# Patient Record
Sex: FEMALE | Race: WHITE | NOT HISPANIC OR LATINO | URBAN - METROPOLITAN AREA
[De-identification: names, ages, dates, MRNs, and addresses within clinical notes are randomized per-mention and may not be internally consistent; named-entity substitution may affect disease eponyms.]

---

## 2017-01-23 ENCOUNTER — COMMUNICATION - HEALTHEAST (OUTPATIENT)
Dept: FAMILY MEDICINE | Facility: CLINIC | Age: 52
End: 2017-01-23

## 2017-02-22 ENCOUNTER — HOSPITAL ENCOUNTER (OUTPATIENT)
Dept: MAMMOGRAPHY | Facility: CLINIC | Age: 52
Discharge: HOME | End: 2017-02-22
Attending: FAMILY MEDICINE

## 2017-02-22 DIAGNOSIS — Z12.31 VISIT FOR SCREENING MAMMOGRAM: ICD-10-CM

## 2017-03-02 ENCOUNTER — OFFICE VISIT - HEALTHEAST (OUTPATIENT)
Dept: FAMILY MEDICINE | Facility: CLINIC | Age: 52
End: 2017-03-02

## 2017-03-02 DIAGNOSIS — R07.0 THROAT PAIN: ICD-10-CM

## 2017-03-02 DIAGNOSIS — J02.9 ACUTE VIRAL PHARYNGITIS: ICD-10-CM

## 2017-03-02 RX ORDER — KRILL/OM-3/DHA/EPA/PHOSPHO/AST 500-110 MG
CAPSULE ORAL
Status: SHIPPED | COMMUNITY
Start: 2007-01-29

## 2017-03-04 ENCOUNTER — COMMUNICATION - HEALTHEAST (OUTPATIENT)
Dept: FAMILY MEDICINE | Facility: CLINIC | Age: 52
End: 2017-03-04

## 2017-03-15 ENCOUNTER — OFFICE VISIT - HEALTHEAST (OUTPATIENT)
Dept: FAMILY MEDICINE | Facility: CLINIC | Age: 52
End: 2017-03-15

## 2017-03-15 ENCOUNTER — COMMUNICATION - HEALTHEAST (OUTPATIENT)
Dept: FAMILY MEDICINE | Facility: CLINIC | Age: 52
End: 2017-03-15

## 2017-03-15 DIAGNOSIS — R53.83 TIRED: ICD-10-CM

## 2017-03-15 DIAGNOSIS — R11.0 NAUSEA: ICD-10-CM

## 2017-03-15 DIAGNOSIS — J45.991 COUGH VARIANT ASTHMA: ICD-10-CM

## 2017-03-15 DIAGNOSIS — J06.9 URI (UPPER RESPIRATORY INFECTION): ICD-10-CM

## 2017-03-15 DIAGNOSIS — I10 ESSENTIAL HYPERTENSION: ICD-10-CM

## 2017-03-15 DIAGNOSIS — E78.00 HYPERCHOLESTEROLEMIA: ICD-10-CM

## 2017-03-15 DIAGNOSIS — R10.9 ABDOMINAL PAIN: ICD-10-CM

## 2017-03-15 LAB
CHOLEST SERPL-MCNC: 161 MG/DL
FASTING STATUS PATIENT QL REPORTED: YES
HDLC SERPL-MCNC: 33 MG/DL
LDLC SERPL CALC-MCNC: 100 MG/DL
TRIGL SERPL-MCNC: 142 MG/DL

## 2017-03-15 ASSESSMENT — MIFFLIN-ST. JEOR: SCORE: 1251.79

## 2017-03-23 ENCOUNTER — COMMUNICATION - HEALTHEAST (OUTPATIENT)
Dept: FAMILY MEDICINE | Facility: CLINIC | Age: 52
End: 2017-03-23

## 2017-03-23 ENCOUNTER — AMBULATORY - HEALTHEAST (OUTPATIENT)
Dept: FAMILY MEDICINE | Facility: CLINIC | Age: 52
End: 2017-03-23

## 2017-03-23 ENCOUNTER — COMMUNICATION - HEALTHEAST (OUTPATIENT)
Dept: SCHEDULING | Facility: CLINIC | Age: 52
End: 2017-03-23

## 2017-03-24 ENCOUNTER — AMBULATORY - HEALTHEAST (OUTPATIENT)
Dept: FAMILY MEDICINE | Facility: CLINIC | Age: 52
End: 2017-03-24

## 2017-04-11 ENCOUNTER — COMMUNICATION - HEALTHEAST (OUTPATIENT)
Dept: FAMILY MEDICINE | Facility: CLINIC | Age: 52
End: 2017-04-11

## 2017-04-21 ENCOUNTER — AMBULATORY - HEALTHEAST (OUTPATIENT)
Dept: FAMILY MEDICINE | Facility: CLINIC | Age: 52
End: 2017-04-21

## 2017-04-21 DIAGNOSIS — R22.9 LOCALIZED SKIN MASS, LUMP, OR SWELLING: ICD-10-CM

## 2017-04-26 ENCOUNTER — OFFICE VISIT - HEALTHEAST (OUTPATIENT)
Dept: SURGERY | Facility: CLINIC | Age: 52
End: 2017-04-26

## 2017-04-26 DIAGNOSIS — D17.1 LIPOMA OF BACK: ICD-10-CM

## 2017-04-26 ASSESSMENT — MIFFLIN-ST. JEOR: SCORE: 1277.64

## 2017-05-01 ENCOUNTER — AMBULATORY - HEALTHEAST (OUTPATIENT)
Dept: SURGERY | Facility: CLINIC | Age: 52
End: 2017-05-01

## 2017-05-02 ENCOUNTER — RECORDS - HEALTHEAST (OUTPATIENT)
Dept: ADMINISTRATIVE | Facility: OTHER | Age: 52
End: 2017-05-02

## 2017-05-26 ENCOUNTER — COMMUNICATION - HEALTHEAST (OUTPATIENT)
Dept: FAMILY MEDICINE | Facility: CLINIC | Age: 52
End: 2017-05-26

## 2017-05-26 DIAGNOSIS — E28.319 PREMATURE MENOPAUSE: ICD-10-CM

## 2017-08-29 ENCOUNTER — COMMUNICATION - HEALTHEAST (OUTPATIENT)
Dept: FAMILY MEDICINE | Facility: CLINIC | Age: 52
End: 2017-08-29

## 2017-10-03 ENCOUNTER — COMMUNICATION - HEALTHEAST (OUTPATIENT)
Dept: FAMILY MEDICINE | Facility: CLINIC | Age: 52
End: 2017-10-03

## 2017-10-04 ENCOUNTER — AMBULATORY - HEALTHEAST (OUTPATIENT)
Dept: FAMILY MEDICINE | Facility: CLINIC | Age: 52
End: 2017-10-04

## 2017-11-02 ENCOUNTER — COMMUNICATION - HEALTHEAST (OUTPATIENT)
Dept: FAMILY MEDICINE | Facility: CLINIC | Age: 52
End: 2017-11-02

## 2017-11-02 DIAGNOSIS — B00.9 HERPES SIMPLEX: ICD-10-CM

## 2017-11-15 ENCOUNTER — COMMUNICATION - HEALTHEAST (OUTPATIENT)
Dept: TELEHEALTH | Facility: CLINIC | Age: 52
End: 2017-11-15

## 2017-11-15 ENCOUNTER — OFFICE VISIT - HEALTHEAST (OUTPATIENT)
Dept: FAMILY MEDICINE | Facility: CLINIC | Age: 52
End: 2017-11-15

## 2017-11-15 DIAGNOSIS — Z12.11 SCREEN FOR COLON CANCER: ICD-10-CM

## 2017-11-15 DIAGNOSIS — Z23 FLU VACCINE NEED: ICD-10-CM

## 2017-11-15 DIAGNOSIS — Z23 NEED FOR INFLUENZA VACCINATION: ICD-10-CM

## 2017-11-15 DIAGNOSIS — Z00.00 ROUTINE GENERAL MEDICAL EXAMINATION AT A HEALTH CARE FACILITY: ICD-10-CM

## 2017-11-15 DIAGNOSIS — Z12.4 PAP SMEAR FOR CERVICAL CANCER SCREENING: ICD-10-CM

## 2017-11-15 ASSESSMENT — MIFFLIN-ST. JEOR: SCORE: 1251.79

## 2017-11-16 LAB
CHOLEST SERPL-MCNC: 239 MG/DL
FASTING STATUS PATIENT QL REPORTED: YES
HDLC SERPL-MCNC: 43 MG/DL
LDLC SERPL CALC-MCNC: 160 MG/DL
TRIGL SERPL-MCNC: 179 MG/DL

## 2017-11-20 ENCOUNTER — AMBULATORY - HEALTHEAST (OUTPATIENT)
Dept: FAMILY MEDICINE | Facility: CLINIC | Age: 52
End: 2017-11-20

## 2017-11-21 LAB
HPV INTERPRETATION - HISTORICAL: NORMAL
HPV INTERPRETER - HISTORICAL: NORMAL

## 2017-11-22 LAB
BKR LAB AP ABNORMAL BLEEDING: NO
BKR LAB AP BIRTH CONTROL/HORMONES: NORMAL
BKR LAB AP CERVICAL APPEARANCE: NORMAL
BKR LAB AP GYN ADEQUACY: NORMAL
BKR LAB AP GYN INTERPRETATION: NORMAL
BKR LAB AP HPV REFLEX: NORMAL
BKR LAB AP LMP: NORMAL
BKR LAB AP PATIENT STATUS: NORMAL
BKR LAB AP PREVIOUS ABNORMAL: NO
BKR LAB AP PREVIOUS NORMAL: 2014
HIGH RISK?: NO
PATH REPORT.COMMENTS IMP SPEC: NORMAL
RESULT FLAG (HE HISTORICAL CONVERSION): NORMAL

## 2017-12-01 ENCOUNTER — COMMUNICATION - HEALTHEAST (OUTPATIENT)
Dept: FAMILY MEDICINE | Facility: CLINIC | Age: 52
End: 2017-12-01

## 2017-12-01 ENCOUNTER — AMBULATORY - HEALTHEAST (OUTPATIENT)
Dept: FAMILY MEDICINE | Facility: CLINIC | Age: 52
End: 2017-12-01

## 2017-12-01 DIAGNOSIS — I10 HYPERTENSION: ICD-10-CM

## 2017-12-04 ENCOUNTER — COMMUNICATION - HEALTHEAST (OUTPATIENT)
Dept: FAMILY MEDICINE | Facility: CLINIC | Age: 52
End: 2017-12-04

## 2017-12-04 DIAGNOSIS — B00.9 HERPES SIMPLEX: ICD-10-CM

## 2017-12-06 ENCOUNTER — COMMUNICATION - HEALTHEAST (OUTPATIENT)
Dept: FAMILY MEDICINE | Facility: CLINIC | Age: 52
End: 2017-12-06

## 2017-12-06 DIAGNOSIS — E28.319 PREMATURE MENOPAUSE: ICD-10-CM

## 2017-12-08 ENCOUNTER — AMBULATORY - HEALTHEAST (OUTPATIENT)
Dept: FAMILY MEDICINE | Facility: CLINIC | Age: 52
End: 2017-12-08

## 2017-12-08 DIAGNOSIS — E28.319 PREMATURE MENOPAUSE: ICD-10-CM

## 2017-12-12 ENCOUNTER — COMMUNICATION - HEALTHEAST (OUTPATIENT)
Dept: FAMILY MEDICINE | Facility: CLINIC | Age: 52
End: 2017-12-12

## 2017-12-13 ENCOUNTER — COMMUNICATION - HEALTHEAST (OUTPATIENT)
Dept: FAMILY MEDICINE | Facility: CLINIC | Age: 52
End: 2017-12-13

## 2017-12-19 ENCOUNTER — COMMUNICATION - HEALTHEAST (OUTPATIENT)
Dept: FAMILY MEDICINE | Facility: CLINIC | Age: 52
End: 2017-12-19

## 2018-01-16 ENCOUNTER — COMMUNICATION - HEALTHEAST (OUTPATIENT)
Dept: FAMILY MEDICINE | Facility: CLINIC | Age: 53
End: 2018-01-16

## 2018-01-19 ENCOUNTER — COMMUNICATION - HEALTHEAST (OUTPATIENT)
Dept: FAMILY MEDICINE | Facility: CLINIC | Age: 53
End: 2018-01-19

## 2018-01-19 DIAGNOSIS — B00.9 HERPES SIMPLEX: ICD-10-CM

## 2018-03-07 ENCOUNTER — COMMUNICATION - HEALTHEAST (OUTPATIENT)
Dept: FAMILY MEDICINE | Facility: CLINIC | Age: 53
End: 2018-03-07

## 2018-03-19 ENCOUNTER — COMMUNICATION - HEALTHEAST (OUTPATIENT)
Dept: FAMILY MEDICINE | Facility: CLINIC | Age: 53
End: 2018-03-19

## 2018-03-23 ENCOUNTER — HOSPITAL ENCOUNTER (OUTPATIENT)
Dept: MAMMOGRAPHY | Facility: CLINIC | Age: 53
Discharge: HOME | End: 2018-03-23
Attending: FAMILY MEDICINE

## 2018-03-23 DIAGNOSIS — Z12.31 VISIT FOR SCREENING MAMMOGRAM: ICD-10-CM

## 2018-03-26 ENCOUNTER — OFFICE VISIT - HEALTHEAST (OUTPATIENT)
Dept: SURGERY | Facility: CLINIC | Age: 53
End: 2018-03-26

## 2018-03-26 DIAGNOSIS — D17.1 LIPOMA OF TORSO: ICD-10-CM

## 2018-03-26 ASSESSMENT — MIFFLIN-ST. JEOR: SCORE: 1265.4

## 2018-03-29 ENCOUNTER — COMMUNICATION - HEALTHEAST (OUTPATIENT)
Dept: FAMILY MEDICINE | Facility: CLINIC | Age: 53
End: 2018-03-29

## 2018-03-29 ENCOUNTER — COMMUNICATION - HEALTHEAST (OUTPATIENT)
Dept: SURGERY | Facility: CLINIC | Age: 53
End: 2018-03-29

## 2018-03-30 ENCOUNTER — AMBULATORY - HEALTHEAST (OUTPATIENT)
Dept: FAMILY MEDICINE | Facility: CLINIC | Age: 53
End: 2018-03-30

## 2018-04-03 ENCOUNTER — COMMUNICATION - HEALTHEAST (OUTPATIENT)
Dept: FAMILY MEDICINE | Facility: CLINIC | Age: 53
End: 2018-04-03

## 2018-04-23 ENCOUNTER — OFFICE VISIT - HEALTHEAST (OUTPATIENT)
Dept: FAMILY MEDICINE | Facility: CLINIC | Age: 53
End: 2018-04-23

## 2018-04-23 DIAGNOSIS — Z01.818 PRE-OP EXAM: ICD-10-CM

## 2018-04-23 DIAGNOSIS — I10 ESSENTIAL HYPERTENSION: ICD-10-CM

## 2018-04-23 DIAGNOSIS — G47.33 OBSTRUCTIVE SLEEP APNEA OF ADULT: ICD-10-CM

## 2018-04-23 DIAGNOSIS — M19.049 ARTHROPATHY OF HAND: ICD-10-CM

## 2018-04-23 DIAGNOSIS — R22.2 MASS ON BACK: ICD-10-CM

## 2018-04-23 LAB
ATRIAL RATE - MUSE: 61 BPM
DIASTOLIC BLOOD PRESSURE - MUSE: NORMAL MMHG
INTERPRETATION ECG - MUSE: NORMAL
P AXIS - MUSE: 39 DEGREES
PR INTERVAL - MUSE: 162 MS
QRS DURATION - MUSE: 62 MS
QT - MUSE: 402 MS
QTC - MUSE: 404 MS
R AXIS - MUSE: 29 DEGREES
SYSTOLIC BLOOD PRESSURE - MUSE: NORMAL MMHG
T AXIS - MUSE: 41 DEGREES
VENTRICULAR RATE- MUSE: 61 BPM

## 2018-04-23 ASSESSMENT — MIFFLIN-ST. JEOR: SCORE: 1242.72

## 2018-04-24 LAB
ANION GAP SERPL CALCULATED.3IONS-SCNC: 14 MMOL/L (ref 5–18)
BUN SERPL-MCNC: 13 MG/DL (ref 8–22)
CALCIUM SERPL-MCNC: 9.6 MG/DL (ref 8.5–10.5)
CHLORIDE BLD-SCNC: 104 MMOL/L (ref 98–107)
CO2 SERPL-SCNC: 22 MMOL/L (ref 22–31)
CREAT SERPL-MCNC: 0.76 MG/DL (ref 0.6–1.1)
ERYTHROCYTE [DISTWIDTH] IN BLOOD BY AUTOMATED COUNT: 11.3 % (ref 11–14.5)
GFR SERPL CREATININE-BSD FRML MDRD: >60 ML/MIN/1.73M2
GLUCOSE BLD-MCNC: 87 MG/DL (ref 70–125)
HCT VFR BLD AUTO: 40.6 % (ref 35–47)
HGB BLD-MCNC: 13.7 G/DL (ref 12–16)
MCH RBC QN AUTO: 31.9 PG (ref 27–34)
MCHC RBC AUTO-ENTMCNC: 33.6 G/DL (ref 32–36)
MCV RBC AUTO: 95 FL (ref 80–100)
PLATELET # BLD AUTO: 267 THOU/UL (ref 140–440)
PMV BLD AUTO: 7.7 FL (ref 7–10)
POTASSIUM BLD-SCNC: 3.7 MMOL/L (ref 3.5–5)
RBC # BLD AUTO: 4.27 MILL/UL (ref 3.8–5.4)
SODIUM SERPL-SCNC: 140 MMOL/L (ref 136–145)
WBC: 4.8 THOU/UL (ref 4–11)

## 2018-04-26 ENCOUNTER — AMBULATORY - HEALTHEAST (OUTPATIENT)
Dept: FAMILY MEDICINE | Facility: CLINIC | Age: 53
End: 2018-04-26

## 2018-04-27 ENCOUNTER — RECORDS - HEALTHEAST (OUTPATIENT)
Dept: ADMINISTRATIVE | Facility: OTHER | Age: 53
End: 2018-04-27

## 2018-05-01 ASSESSMENT — MIFFLIN-ST. JEOR: SCORE: 1265.4

## 2018-05-02 ENCOUNTER — ANESTHESIA - HEALTHEAST (OUTPATIENT)
Dept: SURGERY | Facility: AMBULATORY SURGERY CENTER | Age: 53
End: 2018-05-02

## 2018-05-03 ENCOUNTER — SURGERY - HEALTHEAST (OUTPATIENT)
Dept: SURGERY | Facility: AMBULATORY SURGERY CENTER | Age: 53
End: 2018-05-03

## 2018-05-08 ENCOUNTER — COMMUNICATION - HEALTHEAST (OUTPATIENT)
Dept: SURGERY | Facility: CLINIC | Age: 53
End: 2018-05-08

## 2018-05-14 ENCOUNTER — COMMUNICATION - HEALTHEAST (OUTPATIENT)
Dept: SURGERY | Facility: CLINIC | Age: 53
End: 2018-05-14

## 2018-05-24 ENCOUNTER — COMMUNICATION - HEALTHEAST (OUTPATIENT)
Dept: FAMILY MEDICINE | Facility: CLINIC | Age: 53
End: 2018-05-24

## 2018-06-25 ENCOUNTER — RECORDS - HEALTHEAST (OUTPATIENT)
Dept: ADMINISTRATIVE | Facility: OTHER | Age: 53
End: 2018-06-25

## 2018-07-16 ENCOUNTER — COMMUNICATION - HEALTHEAST (OUTPATIENT)
Dept: FAMILY MEDICINE | Facility: CLINIC | Age: 53
End: 2018-07-16

## 2018-07-16 ENCOUNTER — AMBULATORY - HEALTHEAST (OUTPATIENT)
Dept: FAMILY MEDICINE | Facility: CLINIC | Age: 53
End: 2018-07-16

## 2018-07-17 ENCOUNTER — AMBULATORY - HEALTHEAST (OUTPATIENT)
Dept: FAMILY MEDICINE | Facility: CLINIC | Age: 53
End: 2018-07-17

## 2018-07-17 DIAGNOSIS — N95.1 SYMPTOMATIC MENOPAUSAL OR FEMALE CLIMACTERIC STATES: ICD-10-CM

## 2018-08-15 ENCOUNTER — COMMUNICATION - HEALTHEAST (OUTPATIENT)
Dept: FAMILY MEDICINE | Facility: CLINIC | Age: 53
End: 2018-08-15

## 2018-08-15 DIAGNOSIS — I10 ESSENTIAL HYPERTENSION: ICD-10-CM

## 2018-09-05 ENCOUNTER — COMMUNICATION - HEALTHEAST (OUTPATIENT)
Dept: FAMILY MEDICINE | Facility: CLINIC | Age: 53
End: 2018-09-05

## 2018-09-05 DIAGNOSIS — I10 ESSENTIAL HYPERTENSION: ICD-10-CM

## 2018-10-04 ENCOUNTER — COMMUNICATION - HEALTHEAST (OUTPATIENT)
Dept: FAMILY MEDICINE | Facility: CLINIC | Age: 53
End: 2018-10-04

## 2018-10-04 DIAGNOSIS — B00.9 HERPES SIMPLEX: ICD-10-CM

## 2018-10-15 ENCOUNTER — COMMUNICATION - HEALTHEAST (OUTPATIENT)
Dept: FAMILY MEDICINE | Facility: CLINIC | Age: 53
End: 2018-10-15

## 2018-10-15 DIAGNOSIS — E28.319 PREMATURE MENOPAUSE: ICD-10-CM

## 2018-11-14 ENCOUNTER — COMMUNICATION - HEALTHEAST (OUTPATIENT)
Dept: FAMILY MEDICINE | Facility: CLINIC | Age: 53
End: 2018-11-14

## 2018-11-14 DIAGNOSIS — B00.9 HERPES SIMPLEX: ICD-10-CM

## 2018-11-21 ENCOUNTER — RECORDS - HEALTHEAST (OUTPATIENT)
Dept: ADMINISTRATIVE | Facility: OTHER | Age: 53
End: 2018-11-21

## 2018-11-21 ENCOUNTER — OFFICE VISIT - HEALTHEAST (OUTPATIENT)
Dept: FAMILY MEDICINE | Facility: CLINIC | Age: 53
End: 2018-11-21

## 2018-11-21 DIAGNOSIS — Z00.00 ROUTINE GENERAL MEDICAL EXAMINATION AT A HEALTH CARE FACILITY: ICD-10-CM

## 2018-11-21 DIAGNOSIS — I10 ESSENTIAL HYPERTENSION: ICD-10-CM

## 2018-11-21 DIAGNOSIS — Z12.11 SCREEN FOR COLON CANCER: ICD-10-CM

## 2018-11-21 DIAGNOSIS — G47.33 OBSTRUCTIVE SLEEP APNEA OF ADULT: ICD-10-CM

## 2018-11-21 DIAGNOSIS — Z87.891 HISTORY OF SMOKING: ICD-10-CM

## 2018-11-21 ASSESSMENT — MIFFLIN-ST. JEOR: SCORE: 1260.86

## 2018-11-24 ENCOUNTER — COMMUNICATION - HEALTHEAST (OUTPATIENT)
Dept: FAMILY MEDICINE | Facility: CLINIC | Age: 53
End: 2018-11-24

## 2018-11-26 ENCOUNTER — COMMUNICATION - HEALTHEAST (OUTPATIENT)
Dept: FAMILY MEDICINE | Facility: CLINIC | Age: 53
End: 2018-11-26

## 2018-11-26 DIAGNOSIS — N95.1 SYMPTOMATIC MENOPAUSAL OR FEMALE CLIMACTERIC STATES: ICD-10-CM

## 2018-12-03 ENCOUNTER — RECORDS - HEALTHEAST (OUTPATIENT)
Dept: ADMINISTRATIVE | Facility: OTHER | Age: 53
End: 2018-12-03

## 2018-12-27 ENCOUNTER — COMMUNICATION - HEALTHEAST (OUTPATIENT)
Dept: FAMILY MEDICINE | Facility: CLINIC | Age: 53
End: 2018-12-27

## 2018-12-28 ENCOUNTER — AMBULATORY - HEALTHEAST (OUTPATIENT)
Dept: FAMILY MEDICINE | Facility: CLINIC | Age: 53
End: 2018-12-28

## 2018-12-28 DIAGNOSIS — Z78.0 POST-MENOPAUSE: ICD-10-CM

## 2019-01-07 ENCOUNTER — COMMUNICATION - HEALTHEAST (OUTPATIENT)
Dept: FAMILY MEDICINE | Facility: CLINIC | Age: 54
End: 2019-01-07

## 2019-01-07 DIAGNOSIS — I10 ESSENTIAL HYPERTENSION: ICD-10-CM

## 2019-02-18 ENCOUNTER — COMMUNICATION - HEALTHEAST (OUTPATIENT)
Dept: FAMILY MEDICINE | Facility: CLINIC | Age: 54
End: 2019-02-18

## 2019-02-18 DIAGNOSIS — I10 ESSENTIAL HYPERTENSION: ICD-10-CM

## 2019-04-01 ENCOUNTER — COMMUNICATION - HEALTHEAST (OUTPATIENT)
Dept: FAMILY MEDICINE | Facility: CLINIC | Age: 54
End: 2019-04-01

## 2019-04-01 DIAGNOSIS — E28.319 PREMATURE MENOPAUSE: ICD-10-CM

## 2019-04-01 DIAGNOSIS — J45.901 ASTHMA ATTACK: ICD-10-CM

## 2019-04-01 DIAGNOSIS — B00.9 HERPES SIMPLEX: ICD-10-CM

## 2019-04-01 DIAGNOSIS — N95.1 SYMPTOMATIC MENOPAUSAL OR FEMALE CLIMACTERIC STATES: ICD-10-CM

## 2019-04-01 DIAGNOSIS — I10 ESSENTIAL HYPERTENSION: ICD-10-CM

## 2019-04-01 DIAGNOSIS — I10 HYPERTENSION: ICD-10-CM

## 2019-04-29 ENCOUNTER — COMMUNICATION - HEALTHEAST (OUTPATIENT)
Dept: FAMILY MEDICINE | Facility: CLINIC | Age: 54
End: 2019-04-29

## 2019-04-29 DIAGNOSIS — E28.319 PREMATURE MENOPAUSE: ICD-10-CM

## 2019-04-29 DIAGNOSIS — J45.901 ASTHMA ATTACK: ICD-10-CM

## 2019-04-29 DIAGNOSIS — I10 HYPERTENSION: ICD-10-CM

## 2019-04-29 DIAGNOSIS — B00.9 HERPES SIMPLEX: ICD-10-CM

## 2019-04-29 DIAGNOSIS — N95.1 SYMPTOMATIC MENOPAUSAL OR FEMALE CLIMACTERIC STATES: ICD-10-CM

## 2019-04-29 DIAGNOSIS — I10 ESSENTIAL HYPERTENSION: ICD-10-CM

## 2019-05-02 RX ORDER — HYDROCHLOROTHIAZIDE 25 MG/1
25 TABLET ORAL DAILY
Qty: 90 TABLET | Refills: 0 | Status: SHIPPED | OUTPATIENT
Start: 2019-05-02

## 2019-06-10 ENCOUNTER — OFFICE VISIT - HEALTHEAST (OUTPATIENT)
Dept: FAMILY MEDICINE | Facility: CLINIC | Age: 54
End: 2019-06-10

## 2019-06-10 ENCOUNTER — HOSPITAL ENCOUNTER (OUTPATIENT)
Dept: MAMMOGRAPHY | Facility: CLINIC | Age: 54
Discharge: HOME | End: 2019-06-10

## 2019-06-10 DIAGNOSIS — R22.9 NODULE, SUBCUTANEOUS: ICD-10-CM

## 2019-06-10 DIAGNOSIS — Z12.31 VISIT FOR SCREENING MAMMOGRAM: ICD-10-CM

## 2019-06-18 ENCOUNTER — COMMUNICATION - HEALTHEAST (OUTPATIENT)
Dept: FAMILY MEDICINE | Facility: CLINIC | Age: 54
End: 2019-06-18

## 2019-06-18 DIAGNOSIS — E28.319 PREMATURE MENOPAUSE: ICD-10-CM

## 2019-06-18 DIAGNOSIS — I10 ESSENTIAL HYPERTENSION: ICD-10-CM

## 2019-07-22 ENCOUNTER — COMMUNICATION - HEALTHEAST (OUTPATIENT)
Dept: FAMILY MEDICINE | Facility: CLINIC | Age: 54
End: 2019-07-22

## 2019-07-22 DIAGNOSIS — K21.9 GASTROESOPHAGEAL REFLUX DISEASE, ESOPHAGITIS PRESENCE NOT SPECIFIED: ICD-10-CM

## 2019-07-22 DIAGNOSIS — B00.9 HERPES SIMPLEX: ICD-10-CM

## 2019-07-30 ENCOUNTER — COMMUNICATION - HEALTHEAST (OUTPATIENT)
Dept: FAMILY MEDICINE | Facility: CLINIC | Age: 54
End: 2019-07-30

## 2019-08-06 ENCOUNTER — COMMUNICATION - HEALTHEAST (OUTPATIENT)
Dept: FAMILY MEDICINE | Facility: CLINIC | Age: 54
End: 2019-08-06

## 2019-08-06 DIAGNOSIS — N95.1 SYMPTOMATIC MENOPAUSAL OR FEMALE CLIMACTERIC STATES: ICD-10-CM

## 2019-08-06 DIAGNOSIS — E28.319 PREMATURE MENOPAUSE: ICD-10-CM

## 2019-08-07 ENCOUNTER — COMMUNICATION - HEALTHEAST (OUTPATIENT)
Dept: FAMILY MEDICINE | Facility: CLINIC | Age: 54
End: 2019-08-07

## 2019-08-14 ENCOUNTER — COMMUNICATION - HEALTHEAST (OUTPATIENT)
Dept: FAMILY MEDICINE | Facility: CLINIC | Age: 54
End: 2019-08-14

## 2019-10-29 ENCOUNTER — RECORDS - HEALTHEAST (OUTPATIENT)
Dept: ADMINISTRATIVE | Facility: OTHER | Age: 54
End: 2019-10-29

## 2019-11-04 ENCOUNTER — RECORDS - HEALTHEAST (OUTPATIENT)
Dept: ADMINISTRATIVE | Facility: OTHER | Age: 54
End: 2019-11-04

## 2019-11-27 ENCOUNTER — AMBULATORY - HEALTHEAST (OUTPATIENT)
Dept: FAMILY MEDICINE | Facility: CLINIC | Age: 54
End: 2019-11-27

## 2019-11-27 ENCOUNTER — OFFICE VISIT - HEALTHEAST (OUTPATIENT)
Dept: FAMILY MEDICINE | Facility: CLINIC | Age: 54
End: 2019-11-27

## 2019-11-27 ENCOUNTER — COMMUNICATION - HEALTHEAST (OUTPATIENT)
Dept: FAMILY MEDICINE | Facility: CLINIC | Age: 54
End: 2019-11-27

## 2019-11-27 DIAGNOSIS — Z00.00 ROUTINE GENERAL MEDICAL EXAMINATION AT A HEALTH CARE FACILITY: ICD-10-CM

## 2019-11-27 DIAGNOSIS — J45.901 ASTHMA ATTACK: ICD-10-CM

## 2019-11-27 DIAGNOSIS — E28.319 PREMATURE MENOPAUSE: ICD-10-CM

## 2019-11-27 DIAGNOSIS — K21.9 GASTROESOPHAGEAL REFLUX DISEASE, ESOPHAGITIS PRESENCE NOT SPECIFIED: ICD-10-CM

## 2019-11-27 DIAGNOSIS — R73.9 HYPERGLYCEMIA: ICD-10-CM

## 2019-11-27 DIAGNOSIS — Z12.11 SCREEN FOR COLON CANCER: ICD-10-CM

## 2019-11-27 DIAGNOSIS — I10 HYPERTENSION: ICD-10-CM

## 2019-11-27 DIAGNOSIS — J45.21 MILD INTERMITTENT ASTHMA WITH EXACERBATION: ICD-10-CM

## 2019-11-27 DIAGNOSIS — N95.1 SYMPTOMATIC MENOPAUSAL OR FEMALE CLIMACTERIC STATES: ICD-10-CM

## 2019-11-27 DIAGNOSIS — B00.9 HERPES SIMPLEX VIRUS INFECTION: ICD-10-CM

## 2019-11-27 LAB — HBA1C MFR BLD: 5.4 % (ref 3.5–6)

## 2019-11-27 RX ORDER — ALBUTEROL SULFATE 90 UG/1
2 AEROSOL, METERED RESPIRATORY (INHALATION) EVERY 4 HOURS PRN
Qty: 1 INHALER | Refills: 3 | Status: SHIPPED | COMMUNITY
Start: 2019-11-27

## 2019-11-27 ASSESSMENT — ANXIETY QUESTIONNAIRES
1. FEELING NERVOUS, ANXIOUS, OR ON EDGE: MORE THAN HALF THE DAYS
6. BECOMING EASILY ANNOYED OR IRRITABLE: MORE THAN HALF THE DAYS
GAD7 TOTAL SCORE: 14
IF YOU CHECKED OFF ANY PROBLEMS ON THIS QUESTIONNAIRE, HOW DIFFICULT HAVE THESE PROBLEMS MADE IT FOR YOU TO DO YOUR WORK, TAKE CARE OF THINGS AT HOME, OR GET ALONG WITH OTHER PEOPLE: EXTREMELY DIFFICULT
5. BEING SO RESTLESS THAT IT IS HARD TO SIT STILL: SEVERAL DAYS
4. TROUBLE RELAXING: NEARLY EVERY DAY
2. NOT BEING ABLE TO STOP OR CONTROL WORRYING: MORE THAN HALF THE DAYS
7. FEELING AFRAID AS IF SOMETHING AWFUL MIGHT HAPPEN: MORE THAN HALF THE DAYS
3. WORRYING TOO MUCH ABOUT DIFFERENT THINGS: MORE THAN HALF THE DAYS

## 2019-11-27 ASSESSMENT — MIFFLIN-ST. JEOR: SCORE: 1317.56

## 2019-11-27 ASSESSMENT — PATIENT HEALTH QUESTIONNAIRE - PHQ9: SUM OF ALL RESPONSES TO PHQ QUESTIONS 1-9: 16

## 2019-12-01 ENCOUNTER — COMMUNICATION - HEALTHEAST (OUTPATIENT)
Dept: FAMILY MEDICINE | Facility: CLINIC | Age: 54
End: 2019-12-01

## 2020-05-29 ENCOUNTER — COMMUNICATION - HEALTHEAST (OUTPATIENT)
Dept: FAMILY MEDICINE | Facility: CLINIC | Age: 55
End: 2020-05-29

## 2020-05-29 DIAGNOSIS — B00.9 HERPES SIMPLEX VIRUS INFECTION: ICD-10-CM

## 2020-06-02 ENCOUNTER — COMMUNICATION - HEALTHEAST (OUTPATIENT)
Dept: FAMILY MEDICINE | Facility: CLINIC | Age: 55
End: 2020-06-02

## 2020-06-04 ENCOUNTER — COMMUNICATION - HEALTHEAST (OUTPATIENT)
Dept: FAMILY MEDICINE | Facility: CLINIC | Age: 55
End: 2020-06-04

## 2020-06-04 ENCOUNTER — OFFICE VISIT - HEALTHEAST (OUTPATIENT)
Dept: FAMILY MEDICINE | Facility: CLINIC | Age: 55
End: 2020-06-04

## 2020-06-04 DIAGNOSIS — G47.33 OBSTRUCTIVE SLEEP APNEA OF ADULT: ICD-10-CM

## 2020-06-04 DIAGNOSIS — B00.1 RECURRENT COLD SORES: ICD-10-CM

## 2020-06-04 DIAGNOSIS — I10 ESSENTIAL HYPERTENSION: ICD-10-CM

## 2020-06-04 DIAGNOSIS — J45.991 COUGH VARIANT ASTHMA: ICD-10-CM

## 2020-06-04 DIAGNOSIS — K21.9 GASTROESOPHAGEAL REFLUX DISEASE, ESOPHAGITIS PRESENCE NOT SPECIFIED: ICD-10-CM

## 2020-06-04 ASSESSMENT — PATIENT HEALTH QUESTIONNAIRE - PHQ9: SUM OF ALL RESPONSES TO PHQ QUESTIONS 1-9: 21

## 2020-06-04 ASSESSMENT — ANXIETY QUESTIONNAIRES
1. FEELING NERVOUS, ANXIOUS, OR ON EDGE: MORE THAN HALF THE DAYS
2. NOT BEING ABLE TO STOP OR CONTROL WORRYING: NEARLY EVERY DAY
7. FEELING AFRAID AS IF SOMETHING AWFUL MIGHT HAPPEN: MORE THAN HALF THE DAYS
6. BECOMING EASILY ANNOYED OR IRRITABLE: MORE THAN HALF THE DAYS
5. BEING SO RESTLESS THAT IT IS HARD TO SIT STILL: MORE THAN HALF THE DAYS
GAD7 TOTAL SCORE: 17
3. WORRYING TOO MUCH ABOUT DIFFERENT THINGS: NEARLY EVERY DAY
4. TROUBLE RELAXING: NEARLY EVERY DAY

## 2020-07-29 ENCOUNTER — COMMUNICATION - HEALTHEAST (OUTPATIENT)
Dept: FAMILY MEDICINE | Facility: CLINIC | Age: 55
End: 2020-07-29

## 2020-07-29 DIAGNOSIS — B00.1 RECURRENT COLD SORES: ICD-10-CM

## 2020-07-30 RX ORDER — VALACYCLOVIR HYDROCHLORIDE 500 MG/1
500 TABLET, FILM COATED ORAL DAILY
Qty: 90 TABLET | Refills: 1 | Status: SHIPPED | OUTPATIENT
Start: 2020-07-30

## 2020-08-17 ENCOUNTER — COMMUNICATION - HEALTHEAST (OUTPATIENT)
Dept: FAMILY MEDICINE | Facility: CLINIC | Age: 55
End: 2020-08-17

## 2020-08-17 DIAGNOSIS — E28.319 PREMATURE MENOPAUSE: ICD-10-CM

## 2020-10-12 ENCOUNTER — COMMUNICATION - HEALTHEAST (OUTPATIENT)
Dept: FAMILY MEDICINE | Facility: CLINIC | Age: 55
End: 2020-10-12

## 2020-10-12 DIAGNOSIS — N95.1 SYMPTOMATIC MENOPAUSAL OR FEMALE CLIMACTERIC STATES: ICD-10-CM

## 2020-10-14 RX ORDER — ESTRADIOL 0.5 MG/1
TABLET ORAL
Qty: 90 TABLET | Refills: 1 | Status: SHIPPED | OUTPATIENT
Start: 2020-10-14

## 2020-11-30 ENCOUNTER — COMMUNICATION - HEALTHEAST (OUTPATIENT)
Dept: FAMILY MEDICINE | Facility: CLINIC | Age: 55
End: 2020-11-30

## 2020-11-30 DIAGNOSIS — E28.319 PREMATURE MENOPAUSE: ICD-10-CM

## 2020-11-30 RX ORDER — NORETHINDRONE ACETATE 5 MG
TABLET ORAL
Qty: 45 TABLET | Refills: 0 | Status: SHIPPED | OUTPATIENT
Start: 2020-11-30

## 2020-12-07 ENCOUNTER — RECORDS - HEALTHEAST (OUTPATIENT)
Dept: LAB | Facility: CLINIC | Age: 55
End: 2020-12-07

## 2020-12-08 ENCOUNTER — RECORDS - HEALTHEAST (OUTPATIENT)
Dept: LAB | Facility: CLINIC | Age: 55
End: 2020-12-08

## 2020-12-08 LAB
O+P STL MICRO: NORMAL
SHIGA TOXIN 1: NEGATIVE
SHIGA TOXIN 2: NEGATIVE

## 2020-12-09 LAB — H PYLORI AG STL QL IA: NEGATIVE

## 2020-12-10 LAB — BACTERIA SPEC CULT: NORMAL

## 2021-05-26 ASSESSMENT — PATIENT HEALTH QUESTIONNAIRE - PHQ9: SUM OF ALL RESPONSES TO PHQ QUESTIONS 1-9: 16

## 2021-05-27 ASSESSMENT — PATIENT HEALTH QUESTIONNAIRE - PHQ9: SUM OF ALL RESPONSES TO PHQ QUESTIONS 1-9: 21

## 2021-05-27 NOTE — TELEPHONE ENCOUNTER
I need to know what prescriptions the patient needs and what pharmacy to send those to.  I went into her chart and try to figure that out and noted when I tried to refill a couple of her prescriptions and said there were pending prescriptions but I could not find where those are pended to.  She is on a number of medications and I do not want to send prescriptions that she is no longer taking and I also need to know how she is taking particularly the Estrace and the norethindrone which it looks like were supposed to be tapered off.  Also the albuterol was a prescription from 2016 and does she need that?  The best way for her to get this done we do have been for her to contact her pharmacy and have them fax the requested prescriptions or for you to T up the prescriptions as pended so that I can see those when I opened up the my chart encounter.  Thank you,  Dr. Pretty

## 2021-05-27 NOTE — TELEPHONE ENCOUNTER
Due for labs    Rx renewed per Medication Renewal Policy. Medication was last renewed on 1/8/19.    Kinga Garvin, [facility] Triage/Med Refill 4/2/2019     Requested Prescriptions   Pending Prescriptions Disp Refills     hydroCHLOROthiazide (HYDRODIURIL) 25 MG tablet 90 tablet 0     Sig: Take 1 tablet (25 mg total) by mouth daily.    Diuretics/Combination Diuretics Refill Protocol  Passed - 4/1/2019  1:40 PM       Passed - Visit with PCP or prescribing provider visit in past 12 months    Last office visit with prescriber/PCP: 5/11/2016 Carolyn Waddell MD OR same dept: Visit date not found OR same specialty: 3/15/2017 Jordon Oro MD  Last physical: 11/21/2018 Last MTM visit: Visit date not found   Next visit within 3 mo: Visit date not found  Next physical within 3 mo: Visit date not found  Prescriber OR PCP: Carolyn Waddell MD  Last diagnosis associated with med order: 1. Symptomatic menopausal or female climacteric states    2. Hypertension  - hydroCHLOROthiazide (HYDRODIURIL) 25 MG tablet; Take 1 tablet (25 mg total) by mouth daily.  Dispense: 90 tablet; Refill: 0    If protocol passes may refill for 12 months if within 3 months of last provider visit (or a total of 15 months).            Passed - Serum Potassium in past 12 months     Lab Results   Component Value Date    Potassium 3.7 04/23/2018            Passed - Serum Sodium in past 12 months     Lab Results   Component Value Date    Sodium 140 04/23/2018            Passed - Blood pressure on file in past 12 months    BP Readings from Last 1 Encounters:   11/21/18 122/70            Passed - Serum Creatinine in past 12 months     Creatinine   Date Value Ref Range Status   04/23/2018 0.76 0.60 - 1.10 mg/dL Final

## 2021-05-28 ASSESSMENT — ANXIETY QUESTIONNAIRES
GAD7 TOTAL SCORE: 17
GAD7 TOTAL SCORE: 14

## 2021-05-28 NOTE — TELEPHONE ENCOUNTER
Active prescriptions are pending for [facility]. Once completed, signed and fax [facility] documentation for [facility].

## 2021-05-28 NOTE — TELEPHONE ENCOUNTER
Rx printed along with recent OV notes.    Given to LEONIE Lee to fax.     Please respond to her in mychart when this is done.    Loki Steen, CNP

## 2021-05-29 NOTE — TELEPHONE ENCOUNTER
"Medication last filled 5/2/2019 qty 45 refill 1- class \"print\".   LOV 6/10/2019    Resent prescription to pharmacy.     Joanne Melchor RN [facility] Triage    "

## 2021-05-29 NOTE — PROGRESS NOTES
Assessment & Plan:     1. Nodule, subcutaneous           We reviewed the potential etiologies for her abdominal symptoms and we will continue to monitor closely. We reviewed indications for further evaluation and treatment. We reviewed use of OTC analgesics as well as increased fluids and rest, and she will call or return to clinic with any ongoing or worsening symptoms.       Subjective:           Aster Story is a 54 y.o. female who presents for evaluation of an abdominal lesion, possible hernia. Onset was a few months ago. Symptoms have stabilized. She denies pain but has noted a slight prominence above the umbilicus.     The following portions of the patient's history were reviewed and updated as appropriate: allergies, current medications, past family history, past medical history, past social history, past surgical history and problem list.    Review of Systems  A 12 point comprehensive review of systems was negative except as noted.       Objective:     Vitals:    06/10/19 0935   BP: 118/70   Patient Position: Sitting   Cuff Size: Adult Large   Pulse: 76   SpO2: 100%   Weight: 156 lb 1 oz (70.8 kg)      Body mass index is 26.79 kg/m .   Physical Exam:  GEN: Alert and oriented, NAD,  well nourished  SKIN:  Normal skin turgor, no lesions/rashes   HEENT: moist mucous membranes, no rhinorrhea.    NECK: Normal.  No adenopathy or thyromegaly.  CV: Regular rate and rhythm, no murmurs.   LUNGS: Clear to auscultation bilaterally.    ABDOMEN: Soft, non-tender, non-distended, there is a small area of fullness above the umbilicus but no palable fascial defect.   EXTREMITY: No edema, cyanosis  NEURO: Grossly normal.

## 2021-05-29 NOTE — TELEPHONE ENCOUNTER
RN cannot approve Refill Request    RN can NOT refill this medication overdue for office visits and/or labs.    Gil Lee, [facility] Triage/Med Refill 6/18/2019    Requested Prescriptions   Pending Prescriptions Disp Refills     hydroCHLOROthiazide (HYDRODIURIL) 25 MG tablet [Pharmacy Med Name: HYDROCHLOROT TAB 25MG TABLET] 90 tablet 0     Sig: TAKE 1 TABLET (25 MG TOTAL) BY MOUTH DAILY.       Diuretics/Combination Diuretics Refill Protocol  Failed - 6/18/2019  1:36 PM        Failed - Serum Potassium in past 12 months      No results found for: LN-POTASSIUM          Failed - Serum Sodium in past 12 months      No results found for: LN-SODIUM          Failed - Serum Creatinine in past 12 months      Creatinine   Date Value Ref Range Status   04/23/2018 0.76 0.60 - 1.10 mg/dL Final             Passed - Visit with PCP or prescribing provider visit in past 12 months     Last office visit with prescriber/PCP: Visit date not found OR same dept: 6/10/2019 Sari Roe MD OR same specialty: 6/10/2019 Sari Roe MD  Last physical: Visit date not found Last MTM visit: Visit date not found   Next visit within 3 mo: Visit date not found  Next physical within 3 mo: Visit date not found  Prescriber OR PCP: Loki Steen CNP  Last diagnosis associated with med order: 1. Hypertension  - hydroCHLOROthiazide (HYDRODIURIL) 25 MG tablet [Pharmacy Med Name: HYDROCHLOROT TAB 25MG TABLET]; Take 1 tablet (25 mg total) by mouth daily.  Dispense: 90 tablet; Refill: 0    If protocol passes may refill for 12 months if within 3 months of last provider visit (or a total of 15 months).             Passed - Blood pressure on file in past 12 months     BP Readings from Last 1 Encounters:   06/10/19 118/70

## 2021-05-30 VITALS — HEIGHT: 64 IN | BODY MASS INDEX: 26.07 KG/M2 | WEIGHT: 152.7 LBS

## 2021-05-30 VITALS — WEIGHT: 151.6 LBS | BODY MASS INDEX: 26.02 KG/M2

## 2021-05-30 VITALS — BODY MASS INDEX: 25.1 KG/M2 | HEIGHT: 64 IN | WEIGHT: 147 LBS

## 2021-05-30 NOTE — TELEPHONE ENCOUNTER
Received a fax from the [facility] [email or web address].    Fax is in Dr Waddell's inbox.    7/30/19

## 2021-05-31 VITALS — HEIGHT: 64 IN | BODY MASS INDEX: 25.1 KG/M2 | WEIGHT: 147 LBS

## 2021-05-31 NOTE — TELEPHONE ENCOUNTER
Patient will fill her prescriptions in town until she sees me.  We need to go through each medication and clearly document in the clinical record why each is prescribed.  Once they have a copy of the clinical record, they will [facility].  Carolyn Waddell MD

## 2021-05-31 NOTE — TELEPHONE ENCOUNTER
RN cannot approve Refill Request    RN can NOT refill this medication Last rx had tapering instructions, please provide new SIG thank you.. Last office visit: Visit date not found Last Physical: Visit date not found Last MTM visit: Visit date not found Last visit same specialty: 6/10/2019 Sari Roe MD.  Next visit within 3 mo: Visit date not found  Next physical within 3 mo: Visit date not found      Susi Calero, [facility] Triage/Med Refill 8/7/2019    Requested Prescriptions   Pending Prescriptions Disp Refills     estradiol (ESTRACE) 0.5 MG tablet 180 tablet 0     Sig: TAKE TWO TABLETS DAILY UNTIL SYMPTOMS ARE UNDER CONTROL, THEN MAY BEGIN A TAPER.       Hormone Replacement Therapy Refill Protocol Passed - 8/6/2019  1:53 PM        Passed - PCP or prescribing provider visit in past 12 months       Last office visit with prescriber/PCP: Visit date not found OR same dept: 6/10/2019 Sari Roe MD OR same specialty: 6/10/2019 Sari Roe MD  Last physical: Visit date not found Last MTM visit: Visit date not found     Next visit within 3 mo: Visit date not found  Next physical within 3 mo: Visit date not found  Prescriber OR PCP: Loki Steen CNP  Last diagnosis associated with med order: 1. Symptomatic menopausal or female climacteric states  - estradiol (ESTRACE) 0.5 MG tablet; TAKE TWO TABLETS DAILY UNTIL SYMPTOMS ARE UNDER CONTROL, THEN MAY BEGIN A TAPER.  Dispense: 180 tablet; Refill: 0     If protocol passes may refill for 3 months.

## 2021-05-31 NOTE — TELEPHONE ENCOUNTER
ADV PT  THAT SHE WOULD NEED TO BE SEEN TO FILL OUT RX FORMS FOR [facility]    PT ADV SHE WILL FILL AT HER REGULAR PHARMACY NOW AND PAY FOR MEDICATIONS  AND WILL DISCUSS THIS AT ANNUAL PHYSICAL THAT IS SET UP IN November

## 2021-06-01 VITALS — HEIGHT: 64 IN | WEIGHT: 150 LBS | BODY MASS INDEX: 25.61 KG/M2

## 2021-06-01 VITALS — WEIGHT: 145 LBS | BODY MASS INDEX: 24.75 KG/M2 | HEIGHT: 64 IN

## 2021-06-01 VITALS — BODY MASS INDEX: 25.61 KG/M2 | WEIGHT: 150 LBS | HEIGHT: 64 IN

## 2021-06-01 NOTE — TELEPHONE ENCOUNTER
[facility] only [facility] Norethindrone 0.35mg tablets. Original prescription is for 5mg tablets.     Please review.

## 2021-06-01 NOTE — TELEPHONE ENCOUNTER
Patient Returning Call  Reason for call:  Returning clinic call  Information relayed to patient:  Read to patient the PCP's note from 9/17/19.  Patient has additional questions:  Yes  If YES, what are your questions/concerns:  Patient says that she filled the prescription at retail pharmacy, not [facility] at [facility], will discuss with PCP in November during physical appointment  Okay to leave a detailed message?: No call back needed

## 2021-06-02 VITALS — BODY MASS INDEX: 25.44 KG/M2 | HEIGHT: 64 IN | WEIGHT: 149 LBS

## 2021-06-03 VITALS — WEIGHT: 156.06 LBS | BODY MASS INDEX: 26.79 KG/M2

## 2021-06-03 NOTE — PROGRESS NOTES
Aster Story is a 54 y.o. female is here for a  Health Maintenance exam. Patient is overall doing well.  [facility] and brings in lab work which was done November 4, 2019.  This is reviewed with her.  The only abnormality was a serum glucose of 107 which is 2 points above their normal.  Also LDL is elevated at 157 and total cholesterol 239.  Liver function was slightly elevated with an AST of 46.    Patient generally feels well and has no concerns.  She does suffer with depression and is getting [facility] by [facility] and also a therapist.  She thinks that is going well.  Her PHQ 9 score is 12 with no thoughts of harming herself or others.  Med 7 score is 14.    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  Please see letter which is written and documentation of this condition which allows her to work daytime hours 40 hours/week.    Healthy Habits:   Regular Exercise: Yes  Sunscreen Use: Yes  Healthy Diet: Yes  Dental Visits Regularly: Yes  Seat Belt: Yes  Sexually active: Yes  Self Breast Exam Monthly:Yes  Hemoccults: yes  Flex Sig: No  Colonoscopy: No  Lipid Profile: Yes  Glucose Screen: Yes  Prevention of Osteoporosis: Yes  Last Dexa: No  Guns at Home:  Yes  Guns Safety Locks:  Yes  Domestic Violence:  No    Current Outpatient Medications Include:    Current Outpatient Medications:      calcium carbonate-vitamin D3 (OS-DANIEL 250+ D) 250-125 mg-unit Tab per tablet, Take by mouth., Disp: , Rfl:      cholecalciferol, vitamin D3, 1,000 unit capsule, Take 3,000 Units by mouth., Disp: , Rfl:      estradiol (ESTRACE) 0.5 MG tablet, TAKE TWO TABLETS DAILY UNTIL SYMPTOMS ARE UNDER CONTROL, THEN MAY BEGIN A TAPER., Disp: 90 tablet, Rfl: 1     hydroCHLOROthiazide (HYDRODIURIL) 25 MG tablet, Take 1 tablet (25 mg total) by mouth daily., Disp: 90 tablet, Rfl: 0     LACTOBACILLUS ACIDOPHILUS  (ACIDOPHILUS ORAL), Take by mouth., Disp: , Rfl:      norethindrone (AYGESTIN) 5 mg tablet, TAKE 1/2 TAB ONCE DAILY, Disp: 45 tablet, Rfl: 1     omega-3 fatty acids (FISH OIL) 500 mg cap, Take by mouth., Disp: , Rfl:      omeprazole (PRILOSEC) 20 MG capsule, Take 1 capsule (20 mg total) by mouth daily before breakfast., Disp: 90 capsule, Rfl: 3     PATADAY 0.2 % Drop, , Disp: , Rfl:      potassium chloride (MICRO-K) 10 mEq CR capsule, Take 1 capsule (10 mEq total) by mouth daily., Disp: 90 capsule, Rfl: 1     RESTASIS 0.05 % ophthalmic emulsion, , Disp: , Rfl:      sertraline (ZOLOFT) 50 MG tablet, Take 75 mg by mouth.    , Disp: , Rfl:      valACYclovir (VALTREX) 500 MG tablet, TAKE ONE TABLET BY MOUTH TWICE A DAY FOR 3-5 DAYS-BEGIN AT ONSET-MAY REPEAT AS DIRECTED, Disp: 20 tablet, Rfl: 6     albuterol (VENTOLIN HFA) 90 mcg/actuation inhaler, Inhale 2 puffs every 4 (four) hours as needed for wheezing., Disp: 1 Inhaler, Rfl: 3     tablet cutter Misc, Exchange CPAP mask for wisp nasal mask.   Length of Need: 99, Disp: , Rfl:     Allergies:  Allergies   Allergen Reactions     Lipitor [Atorvastatin] Myalgia       Past Medical History:   Diagnosis Date     Asthma      Dry eye      Hypertension      PONV (postoperative nausea and vomiting)      Pregnancy      Skin cancer      Sleep apnea     uses CPAP nightly       Past Surgical History:   Procedure Laterality Date     CARDIOVASCULAR STRESS TEST Bilateral      metatarsal osteotomy of the first metatarsal Bilateral      AL CV STRS TST XERS&/OR RX CONT ECG W/SI&R      Description: Cardiovascular Stress Test;  Recorded: 2014;  Comments: stress echo normal 2010     spinal diskectomy lumbar N/A      WISDOM TOOTH EXTRACTION         OB History    Para Term  AB Living   1 1 1     1   SAB TAB Ectopic Multiple Live Births                  # Outcome Date GA Lbr Elmer/2nd Weight Sex Delivery Anes PTL Lv   1 Term     F Vag-Spont         Complications: Inverted  uterus, postpartum condition       Immunization History   Administered Date(s) Administered     Hep A, historic 2008, 2009     Hep B, historic 1998, 1998, 01/15/1999     Influenza, inj, historic,unspecified 2011, 10/22/2012, 2013, 10/21/2018     Influenza,seasonal quad, PF 2014     Influenza,seasonal, Inj IIV3 10/13/1997, 1998, 1999, 2000, 2001, 2007, 2007, 10/06/2009, 2011     Influenza,seasonal,quad inj =/> 6months 10/05/2015, 10/20/2016, 11/15/2017     Td, Adult, Absorbed 1994, 2000     Tdap 2010       Family History   Problem Relation Age of Onset     Obstructive Sleep Apnea Father         PULMONARY DISEASE      Heart disease Father      Hypertension Father      Stroke Father      Rheum arthritis Father 40     Osteoporosis Mother      Rheum arthritis Sister      No Medical Problems Daughter        Social History     Socioeconomic History     Marital status:      Spouse name: Onofre     Number of children: 1     Years of education: Not on file     Highest education level: Not on file   Occupational History     Employer: [employer]   Social Needs     Financial resource strain: Not on file     Food insecurity:     Worry: Not on file     Inability: Not on file     Transportation needs:     Medical: Not on file     Non-medical: Not on file   Tobacco Use     Smoking status: Former Smoker     Last attempt to quit: 2008     Years since quittin.8     Smokeless tobacco: Never Used   Substance and Sexual Activity     Alcohol use: Yes     Alcohol/week: 5.0 standard drinks     Types: 5 Glasses of wine per week     Drug use: No     Sexual activity: Yes     Partners: Male   Lifestyle     Physical activity:     Days per week: Not on file     Minutes per session: Not on file     Stress: Not on file   Relationships     Social connections:     Talks on phone: Not on file     Gets together: Not on file     Attends  Confucianism service: Not on file     Active member of club or organization: Not on file     Attends meetings of clubs or organizations: Not on file     Relationship status: Not on file     Intimate partner violence:     Fear of current or ex partner: Not on file     Emotionally abused: Not on file     Physically abused: Not on file     Forced sexual activity: Not on file   Other Topics Concern     Not on file   Social History Narrative     Not on file       Last cholesterol:   Lab Results   Component Value Date    CHOL 239 (H) 11/15/2017    CHOL 161 03/15/2017    CHOL 132 10/20/2016     Lab Results   Component Value Date    HDL 43 (L) 11/15/2017    HDL 33 (L) 03/15/2017    HDL 40 (L) 10/20/2016     Lab Results   Component Value Date    LDLCALC 160 (H) 11/15/2017    LDLCALC 100 03/15/2017    LDLCALC 69 10/20/2016     Lab Results   Component Value Date    TRIG 179 (H) 11/15/2017    TRIG 142 03/15/2017    TRIG 115 10/20/2016     No components found for: CHOLHDL          Birth Control Method: n/a  High Risk/Behavior: none    Colonoscopy is due and she plans to do this next year.  Mammogram was normal Ashley 10, 2019  Pap smear with HPV was normal 11/15/2017.        LMP: No LMP recorded. Patient is postmenopausal.      Review of Systems:   General:  Denies fever, chills, HA, fatigue, myalgias, weight change    Eyes: Denies vision changes   Ears/Nose/Throat: Denies nasal congestion, rhinorrhea, ear pain or discharge, sore throat, swollen glands  Cardiovascular: Denies CP, palpitations  Respiratory:  Denies SOB, cough  Gastrointestinal:  Denies changes in bowel habits, melena, rectal bleeding,  Genitourinary: Denies changes in urine habits/frequency/dysuria, hematuria   Musculoskeletal:  Denies  joint pain or swelling or erythema, edema  Skin: Denies rashes   Neurologic: Denies weakness, paresthesia  Psychiatric: Denies mood changes   Endocrine: Denies polyuria, polydipsia, polyphagia  Heme/Lymphatic: Denies problem with  "bleeding   Allergic/Immunologic: Denies problem     POSITIVES: None except for depression and anxiety.      PHYSICAL EXAM:    /82   Pulse 72   Temp 98.4  F (36.9  C)   Ht 5' 5\" (1.651 m)   Wt 158 lb (71.7 kg)   Breastfeeding No   BMI 26.29 kg/m      Wt Readings from Last 3 Encounters:   06/10/19 156 lb 1 oz (70.8 kg)   11/21/18 149 lb (67.6 kg)   05/01/18 150 lb (68 kg)       Body mass index is 26.29 kg/m .    Well developed, well nourished, no acute distress.  HEENT: normocephalic/atraumatic, PERRLA/EOMI, TMs: Gray, normal light reflex, no nasal discharge.  Oral mucosa: no erythema/exudate  Neck: No LAD/masses/thyromegaly/bruits  Lungs: clear bilaterally  Heart: regular rate and rhythm, no murmurs/gallops/rubs  Breasts: symmetric, no masses/skin changes, nipple discharge, or axillary LAD.  BSE reviewed.  Abdomen: Normal bowel sounds, soft, non-tender, non-distended, no masses, neg Phipps's/McBurney's, no rebound/guarding  Genital: Normal external genitalia, no discharge, no lesions, no CMT, no adnexal tenderness or fullness.  Uterus is not enlarged, perineum intact.  Thin prep not done.  (Pap smear with HPV every 3 to 5 years)  Rectal: internal exam is deferred.  External exam is normal.  Lymphatics: no supraclavicular/axillary/epitrochlear/inguinal LAD. No edema.  Neuro: A&O x 3, CN II-XII intact, strength 5/5, reflexes symmetric, sensory intact to light touch.  Psych: Behavior appropriate, engaging.  Thought processes congruent, non-tangential.  Musculoskeletal: Extremities normal, atraumatic, no swelling  Skin: no rashes or lesions.      Recent Results (from the past 240 hour(s))   Glycosylated Hemoglobin A1c   Result Value Ref Range    Hemoglobin A1c 5.4 3.5 - 6.0 %       ASSESSMENT/PLAN: 54 y.o. female physical exam and pap smear.          1. Routine general medical examination at a health care facility  Patient is doing a very nice job in keeping up on everything.    2. Screen for colon " cancer  She needs to [facility]    3. Herpes simplex virus infection  I change the dose of the Valtrex for better coverage.  She continues to break out with perioral herpes simplex.  - valACYclovir (VALTREX) 1000 MG tablet; Take 2 tablets (2,000 mg total) by mouth 2 (two) times a day for 2 days.  Dispense: 24 tablet; Refill: 3    4. Symptomatic menopausal or female climacteric states  We talked about hormone replacement therapy.  She went through premature menopause and I explained we do not count replacement until age 50 because that the normal age of menopause.  Then  our goal is for her to be totally off estrogen before the age of 55.  In the past year she was only able to be off for about 6 weeks.  I have advised that she start by going off every other day and then if she has more symptoms take it daily for a week and then taper off again.  Some people find that they only needed 1 week out of the month.  She also takes the progesterone.  - estradiol (ESTRACE) 0.5 MG tablet; TAKE TWO TABLETS DAILY UNTIL SYMPTOMS ARE UNDER CONTROL, THEN MAY BEGIN A TAPER.  Dispense: 90 tablet; Refill: 3    5. Hyperglycemia  Since her blood sugar was elevated I recommended checking her A1c.  - Glycosylated Hemoglobin A1c    6. Mild intermittent asthma with exacerbation  She uses albuterol 2 puffs every 4 to 6 hours as needed symptoms.    7. Gastroesophageal reflux disease, esophagitis presence not specified  Patient's been unable to stop omeprazole.  Within 2 to 3 days she has recurring symptoms.  She has no sign of GI bleeding.  Given her persistent symptoms I recommend she undergo upper endoscopy.  She agrees and [facility]  This can be done at the same day that she has her colonoscopy done.  - omeprazole (PRILOSEC) 20 MG capsule; Take 1 capsule (20 mg total) by mouth daily before breakfast.  Dispense: 90 capsule; Refill: 3    8. Hypertension  Patient takes  hydrochlorothiazide 25 mg daily, potassium supplement 10 mEq daily  - potassium chloride (MICRO-K) 10 mEq CR capsule; Take 1 capsule (10 mEq total) by mouth daily.  Dispense: 90 capsule; Refill: 1    9. Premature menopause    - norethindrone (AYGESTIN) 5 mg tablet; TAKE 1/2 TAB ONCE DAILY  Dispense: 45 tablet; Refill: 1    10. Asthma attack    - albuterol (VENTOLIN HFA) 90 mcg/actuation inhaler; Inhale 2 puffs every 4 (four) hours as needed for wheezing.  Dispense: 1 Inhaler; Refill: 3      Medications Discontinued During This Encounter   Medication Reason     hydroCHLOROthiazide (HYDRODIURIL) 25 MG tablet Duplicate order     norethindrone (AYGESTIN) 5 mg tablet Duplicate order       Routine health maintenance discussion:  No smoking, limited alcohol (7 or less servings per week), 5 fruits/veg servings per day, 200 minutes of exercise per week.  Daily calcium/vitamin D guidelines, bone health, yearly mammogram after age 39/regular pap smears/colon cancer screening beginning at age 50.  Accident avoidance, sun screen.      Will contact her with the results of the labs when available.    Carolyn Waddell M.D.

## 2021-06-04 VITALS
BODY MASS INDEX: 26.33 KG/M2 | TEMPERATURE: 98.4 F | SYSTOLIC BLOOD PRESSURE: 120 MMHG | HEART RATE: 72 BPM | DIASTOLIC BLOOD PRESSURE: 82 MMHG | HEIGHT: 65 IN | WEIGHT: 158 LBS

## 2021-06-08 NOTE — TELEPHONE ENCOUNTER
Left message to call back for: No answer, and no voicemail.   Information to relay to patient:  Message below from Dr. Pretty and establish care with a new provdier.

## 2021-06-08 NOTE — PROGRESS NOTES
"Aster Story is a 55 y.o. female who is being evaluated via a billable telephone visit.      The patient has been notified of following:     \"This telephone visit will be conducted via a call between you and your physician/provider. We have found that certain health care needs can be provided without the need for a physical exam.  This service lets us provide the care you need with a short phone conversation.  If a prescription is necessary we can send it directly to your pharmacy.  If lab work is needed we can place an order for that and you can then stop by our lab to have the test done at a later time.    Telephone visits are billed at different rates depending on your insurance coverage. During this emergency period, for some insurers they may be billed the same as an in-person visit.  Please reach out to your insurance provider with any questions.    If during the course of the call the physician/provider feels a telephone visit is not appropriate, you will not be charged for this service.\"    Patient has given verbal consent to a Telephone visit? Yes    What phone number would you like to be contacted at? [phone]     Patient would like to receive their AVS by AVS Preference: Carlos.    Additional provider notes: Patient presents today for medication check.  Known history of cough variant asthma.  One albuterol inhaler will oftentimes last for >1-year.  No recent exacerbations.      Known history of hypertension.  [facility]  Most recent blood pressure showed good control.  This is in the setting of JULIANN and she reports that she uses her CPAP machine religiously.      She also notes a significant improvement in her gastritis/GERD symptoms since initiation of omeprazole.    Patient's primary concern is that she keeps getting recurrent cold sores.  A review of record shows that she has been prescribed valacyclovir a number of times for as needed management.  This always does the trick, but " most recently she needed to use up towards 1 g at a time.  She has never tried suppressive therapy.    Assessment/Plan:  1. Recurrent cold sores    Unfortunately her cold sores are becoming much more recurrent.  We discussed trying suppressive therapy which she is interested in.  Start with 500 mg daily.  If still having breakthrough symptoms, sending a message and we can try 1 g daily.    - valACYclovir (VALTREX) 500 MG tablet; Take 1 tablet (500 mg total) by mouth daily.  Dispense: 30 tablet; Refill: 1    2. Cough variant asthma  Stable.    3. Hypertension  Stable.    4. Obstructive sleep apnea of adult  Stable.    5. Gastroesophageal reflux disease, esophagitis presence not specified  Significantly improved with initiation of PPI.        Phone call duration:  19 minutes    Loki Steen, CNP

## 2021-06-08 NOTE — TELEPHONE ENCOUNTER
Patient Returning Call  Reason for call:  Returning clinic call.  Information relayed to patient:  In reviewing message again with the patient.  The misunderstanding is that she gets Sharps a Gator for her CPAP and yes she gets through Pulmonary.  No she has never had a sharps container.  Patient has additional questions:  No  If YES, what are your questions/concerns:  NA  Okay to leave a detailed message?: No call back needed

## 2021-06-08 NOTE — TELEPHONE ENCOUNTER
Question following Office Visit  When did you see your provider: today  What is your question: I was asked about having a sharps container on my medication list by the nurse. I told her I didn't have one of those and she removed this.  I thing this was for my C-Pap machine and this needs to be replaced. Please return call to discuss.  Okay to leave a detailed message: Yes

## 2021-06-08 NOTE — TELEPHONE ENCOUNTER
RN cannot approve Refill Request    RN can NOT refill this medication No established PCP. Last office visit: 5/11/2016 Carolyn Waddell MD Last Physical: 11/27/2019 Last MTM visit: Visit date not found Last visit same specialty: 6/10/2019 Sari Roe MD.  Next visit within 3 mo: Visit date not found  Next physical within 3 mo: Visit date not found      Jasmyne Reeves, [facility] Triage/Med Refill 5/31/2020    Requested Prescriptions   Pending Prescriptions Disp Refills     valACYclovir (VALTREX) 1000 MG tablet [Pharmacy Med Name: VALACYCLOVIR 1 GRAM TAB 1 GM TABLET] 24 tablet 3     Sig: TAKE 2 TABLETS (2,000 MG TOTAL) BY MOUTH 2 (TWO) TIMES A DAY FOR 2 DAYS.       Antivirals Refill Protocol Failed - 5/29/2020  1:13 PM        Failed - Renal function done in last year     Creatinine   Date Value Ref Range Status   04/23/2018 0.76 0.60 - 1.10 mg/dL Final             Passed - Visit with PCP or prescribing provider visit in past 12 months or next 3 months     Last office visit with prescriber/PCP: 5/11/2016 Carolyn Waddell MD OR same dept: 6/10/2019 Sari Roe MD OR same specialty: 6/10/2019 Sari Roe MD  Last physical: 11/27/2019 Last MTM visit: Visit date not found   Next visit within 3 mo: Visit date not found  Next physical within 3 mo: Visit date not found  Prescriber OR PCP: Carolyn Waddell MD  Last diagnosis associated with med order: 1. Herpes simplex virus infection  - valACYclovir (VALTREX) 1000 MG tablet [Pharmacy Med Name: VALACYCLOVIR 1 GRAM TAB 1 GM TABLET]; Take 2 tablets (2,000 mg total) by mouth 2 (two) times a day for 2 days.  Dispense: 24 tablet; Refill: 3    If protocol passes may refill for 12 months if within 3 months of last provider visit (or a total of 15 months).

## 2021-06-08 NOTE — TELEPHONE ENCOUNTER
LVMTCB - I reviewed outside medications/orders - incoming was a script fo rsharps container but the sig was for a cpap device.    Not sure if she needs supplies or a machine but if she does then those orders come from her pulmonologist. And should follow up with them regarding. I have reconciled list and removed items due to pt stating she did not have.

## 2021-06-08 NOTE — TELEPHONE ENCOUNTER
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 Dr Waddell retired.      Left message to call back for: patient  Information to relay to patient:  Needs visit with new provider to establish care and get approval for ongoing refills on her medication.

## 2021-06-09 NOTE — PROGRESS NOTES
"Aster Story is a 51-year-old with a history of hypertension hypercholesterolemia and asthma who presents today with a 15 day history of not feeling well.  She tells me it started off with some congestion and sore throat.  She had a negative rapid strep.  She is continued to have a sense of some congestion and cough.  She has had some chills but no fever.  No purulent rhinorrhea or productive cough.  She has had a sense of some nausea generalized weakness and diarrhea.  Her bowel movements have been loose about 3 times a day.  She has had some minor abdominal pains.  She has missed some work but is not currently off work.  She has been exposed to others who are also ill but does not know specifics.    We reviewed heart disease prevention and cancer detection she is up-to-date.  She tells me she is no longer taking the Lipitor as it causes muscle achiness and she stopped it several months ago.    Objective:  Visit Vitals     BP 96/60     Pulse 74     Temp 97.5  F (36.4  C)     Resp 16     Ht 5' 4\" (1.626 m)     Wt 147 lb (66.7 kg)     Breastfeeding No     BMI 25.23 kg/m2     In general the patient is weight is down and she does look somewhat tired  Ears are normal nose is clear mouth appears normal neck supple with few notes increase that are nontender  Lungs are clear heart with a regular rate and rhythm without murmur abdomen is soft there is some mild generalized tenderness without rebound guarding or mass bowel sounds positive lower extremities are free of edema skin appears normal neurological examination appears nonfocal    Labs pending    Assessment: Abdominal pain with nausea diarrhea tiredness, URI with underlying asthma, hypercholesterolemia hypertension    Plan: I reviewed with her her exam and my findings.  She is frustrated that there is no clear and easy answer.  I encouraged her on fluids good nutrition good handwashing and rest.  I have offered her time off work she declines.  We will call her with " labs when available.  I have asked her to stop her hydrochlorothiazide as her blood pressure is already low.  She will follow-up here otherwise as needed

## 2021-06-09 NOTE — PROGRESS NOTES
Subjective:   Aster Story is a 51 y.o. female  Roomed by: Camille      Chief Complaint   Patient presents with     Sore Throat     Got bad on 02/27/2017     Headache     Cough     Started last week   Symptoms started around 2/22 with sore throat and cough. Has had some nasal congestion. Denies any CP or SOB. Denies nausea, vomiting, diarrhea or belly pain. Has not missed work. Energy level and appetite have been low. Still the sore throat and headache are main complaints. Has taken motrin and levi-seltzer cold medication. Admits some body aches last night.   Review of Systems  Const - Resp - see HPI  No Known Allergies    Current Outpatient Prescriptions:      albuterol (VENTOLIN HFA) 90 mcg/actuation inhaler, Inhale 2 puffs every 4 (four) hours as needed for wheezing., Disp: 1 Inhaler, Rfl: 3     calcium carbonate-vitamin D3 (OS-DANIEL 250+ D) 250-125 mg-unit Tab per tablet, Take by mouth., Disp: , Rfl:      cholecalciferol, vitamin D3, 1,000 unit capsule, Take 3,000 Units by mouth., Disp: , Rfl:      estradiol (ESTRACE) 1 MG tablet, Take 1 mg by mouth., Disp: , Rfl:      LACTOBACILLUS ACIDOPHILUS (ACIDOPHILUS ORAL), Take by mouth., Disp: , Rfl:      omega-3 fatty acids (FISH OIL) 500 mg cap, Take by mouth., Disp: , Rfl:      omeprazole (PRILOSEC) 20 MG capsule, Take 40 mg by mouth., Disp: , Rfl:      tablet cutter Misc, Exchange CPAP mask for wisp nasal mask.   Length of Need: 99, Disp: , Rfl:      vitamin E 400 UNIT capsule, Take by mouth., Disp: , Rfl:      atorvastatin (LIPITOR) 40 MG tablet, Take 1 tablet (40 mg total) by mouth daily., Disp: 90 tablet, Rfl: 3     benzonatate (TESSALON) 100 MG capsule, TAKE 1 CAPSULE (100 MG TOTAL) BY MOUTH 3 (THREE) TIMES A DAY AS NEEDED  FOR COUGH, Disp: 30 capsule, Rfl: 1     hydroCHLOROthiazide (HYDRODIURIL) 25 MG tablet, TAKE ONE TABLET BY MOUTH ONE TIME DAILY, Disp: 90 tablet, Rfl: 3     norethindrone (AYGESTIN) 5 mg tablet, Take one half tablet daily, Disp: 45 tablet, Rfl:  3     PATADAY 0.2 % Drop, , Disp: , Rfl:      RESTASIS 0.05 % ophthalmic emulsion, , Disp: , Rfl:      valACYclovir (VALTREX) 500 MG tablet, BEGIN AT ONSET OF SYMPTOMS: TAKE ONE TABLET TWICE DAILY FOR 3-5 DAYS. MAY REPEAT AS NEEDED., Disp: 20 tablet, Rfl: 1  Patient Active Problem List   Diagnosis     Major Depression, Single Episode     Hypertension     Cough Variant Asthma     Esophageal Reflux     Osteoarthritis     Arthropathy Of Both Hands     Premature Menopause     Osteopenia     Obstructive sleep apnea of adult     Medical History Reviewed  Objective:     Vitals:    03/02/17 1905   BP: 114/70   Pulse: 87   Resp: 14   Temp: 98.6  F (37  C)   TempSrc: Oral   SpO2: 98%   Weight: 151 lb 9.6 oz (68.8 kg)   Gen - Pt in NAD  Eyes - Conjunctiva non injected, no drainage  Face - non TTP over maxillary and non TTP frontal sinus areas  Ears - external canals - no induration, Right TM - non injected, Left TM - non injected   Nose - not congested  Pharynx - non injected, tonsils 1+ size  Neck - supple, no cervical adenopathy, no masses  Cor - RRR w/o murmur  Lungs - Good air entry; no wheezes or crackles noted on auscultation - no coughing noted  Skin - no lesions, no rashes noted    Results for orders placed or performed in visit on 03/02/17   Rapid Strep A Screen-Throat   Result Value Ref Range    Rapid Strep A Antigen No Group A Strep detected No Group A Strep detected   Lab result discussed on day of visit.     No results found.   Assessment - Plan   1. Acute viral pharyngitis  - alum/mag hydrox-simethicone-diphenhydramine-lidocaine (MAGIC MOUTHWASH) suspension; Gargle and spit out 15 cc ( 1 tablespoon) every 4-6 hours, but no more than 4 doses in 24 hours  Dispense: 120 mL; Refill: 0  No clinical findings indicative of serious bacterial infection, such as pneumonia, sinusitis or otitis media were ascertained from today's evaluation.    2. Throat pain  - Rapid Strep A Screen-Throat  - Group A Strep, RNA Direct  Detection, Throat    At the conclusion of the encounter, assessment and plan were discussed.   All questions were answered.   The patient or guardian acknowledged understanding and was involved in the decision making regarding the overall care plan.    Patient Instructions   1. Keep well hydrated  2. Tylenol or ibuprofen for fever or pain  3. If symptoms are not improving over the next 5-7 days, follow up with primary provider  4. If you have any questions, call the clinic number     Viral Syndrome   GENERAL INFORMATION:   What is viral syndrome? Viral syndrome is a term caregivers use for general symptoms of a viral infection that has no clear cause.   What are the signs and symptoms of viral syndrome? Signs and symptoms may start slowly or suddenly and last hours to days. They can be mild to severe and can change over days or hours.   Fever and chills, or a rash    Runny or stuffy nose     Cough, sore throat, or hoarseness     Headache, or pain and pressure around your eyes     Muscle aches and joint pain     Shortness of breath or wheezing     Abdominal pain, cramps, and diarrhea     Nausea, vomiting, or loss of appetite   How is viral syndrome treated? An illness caused by a virus usually goes away in 10 to 14 days without treatment. The following medicines may be given to help manage your signs and symptoms:   Antipyretics: These reduce fever.    Antihistamines: These help relieve a rash, itching, and trouble breathing.     Decongestants: These decrease a stuffy nose so that you can breathe more easily.     Antitussives: These help control a cough.     Antiviral medicine: These help kill the virus ( like influenza) and control symptoms.    What can I do to help prevent the spread of viral syndrome? Viruses are spread easily from person to person through the air and on shared items. You can spread a virus to other people for weeks after your symptoms go away. The following are ways to prevent the spread of a  virus:   Wash your hands: Wash your hands often with soap and water or use an alcohol-based gel. Wash your hands after you touch someone who is sick.     Wear a mask: A mask can help you prevent the spread of a virus. If you need to wear a mask, ask your caregiver where to get one.     Cook and handle food properly: Cook food completely through. Clean food preparation surfaces with a disinfectant.     When should I contact my caregiver? Contact your caregiver if:   Your symptoms get worse after 5 to 7 days.     Your symptoms do not go away within 10 days.    You have thick drainage or pus coming out of 1 or both nostrils and pain in one side of your face.    You have a fever and pain.    You have green sputum.  When should I seek immediate care? Seek care immediately or call 911 if:   You have continued vomiting and diarrhea.    You have chest pain or trouble breathing.

## 2021-06-10 NOTE — PROGRESS NOTES
Aster is scheduled for excisional biopsy of right shoulder mass with Dr. Amaya on 6/8/17 at [facility]. Patient was given instructions of arrival time, need a , pre-op physical within 30days and NPO after midnight. Patient verbalized understanding.     Ghazal Barreto CMA  Physician    [facility]   [phone]

## 2021-06-10 NOTE — TELEPHONE ENCOUNTER
Refill Approved    Rx renewed per Medication Renewal Policy. Medication was last renewed on 11/27/9.    Kinga Garvin, [facility] Triage/Med Refill 8/18/2020     Requested Prescriptions   Pending Prescriptions Disp Refills     norethindrone (AYGESTIN) 5 mg tablet [Pharmacy Med Name: NORETHINDRONE 5 MG TAB TABLET] 45 tablet 1     Sig: TAKE 1/2 TAB ONCE DAILY       Oral Contraceptives Protocol Passed - 8/17/2020 11:04 AM        Passed - Visit with PCP or prescribing provider visit in last 12 months      Last office visit with prescriber/PCP: Visit date not found OR same dept: Visit date not found OR same specialty: 6/10/2019 Sari Roe MD  Last physical: 11/27/2019 Last MTM visit: Visit date not found   Next visit within 3 mo: Visit date not found  Next physical within 3 mo: Visit date not found  Prescriber OR PCP: Carolyn Waddell MD  Last diagnosis associated with med order: 1. Premature menopause  - norethindrone (AYGESTIN) 5 mg tablet [Pharmacy Med Name: NORETHINDRONE 5 MG TAB TABLET]; TAKE 1/2 TAB ONCE DAILY  Dispense: 45 tablet; Refill: 1    If protocol passes may refill for 12 months if within 3 months of last provider visit (or a total of 15 months).            Hormone Replacement Therapy Refill Protocol Passed - 8/17/2020 11:04 AM        Passed - PCP or prescribing provider visit in past 12 months       Last office visit with prescriber/PCP: Visit date not found OR same dept: Visit date not found OR same specialty: 6/10/2019 Sari Roe MD  Last physical: 11/27/2019 Last MTM visit: Visit date not found     Next visit within 3 mo: Visit date not found  Next physical within 3 mo: Visit date not found  Prescriber OR PCP: Carolyn Waddell MD  Last diagnosis associated with med order: 1. Premature menopause  - norethindrone (AYGESTIN) 5 mg tablet [Pharmacy Med Name: NORETHINDRONE 5 MG TAB TABLET]; TAKE 1/2 TAB ONCE DAILY  Dispense: 45 tablet; Refill: 1     If protocol passes may  refill for 3 months.

## 2021-06-10 NOTE — TELEPHONE ENCOUNTER
Last Med Check: 6/4/20.    Next med check due on: 11/2020.    Future Appointment Scheduled ? No.     Last Med Refill? 6/4/20.    Rosa Hancock, CMA

## 2021-06-10 NOTE — PROGRESS NOTES
Consult to address a R shoulder mass.  Requested by Dr Waddell    HPI: Pt is here with concerns about a subcutaneous mass located over the R scapula.  It has been present for 2 years.   This lesion is not tender.  The lesion has not drained.    Allergies:Lipitor [atorvastatin]    Past Medical History:   Diagnosis Date     Asthma      Dry eye      Hypertension      Pregnancy      Skin cancer        Past Surgical History:   Procedure Laterality Date     CARDIOVASCULAR STRESS TEST Bilateral      metatarsal osteotomy of the first metatarsal Bilateral      IN CV STRS TST XERS&/OR RX CONT ECG W/SI&R      Description: Cardiovascular Stress Test;  Recorded: 09/13/2014;  Comments: stress echo normal 4/8/2010     spinal diskectomy lumbar N/A        REVIEW OF SYSTEMS:  10 point ROS is negative except for; as mentioned above.    CURRENT MEDS:    Current Outpatient Prescriptions:      albuterol (VENTOLIN HFA) 90 mcg/actuation inhaler, Inhale 2 puffs every 4 (four) hours as needed for wheezing., Disp: 1 Inhaler, Rfl: 3     benzonatate (TESSALON) 100 MG capsule, TAKE 1 CAPSULE (100 MG TOTAL) BY MOUTH 3 (THREE) TIMES A DAY AS NEEDED  FOR COUGH, Disp: 30 capsule, Rfl: 1     calcium carbonate-vitamin D3 (OS-DANIEL 250+ D) 250-125 mg-unit Tab per tablet, Take by mouth., Disp: , Rfl:      cholecalciferol, vitamin D3, 1,000 unit capsule, Take 3,000 Units by mouth., Disp: , Rfl:      estradiol (ESTRACE) 0.5 MG tablet, Take 1 tablet (0.5 mg total) by mouth daily., Disp: 90 tablet, Rfl: 3     hydroCHLOROthiazide (HYDRODIURIL) 25 MG tablet, Take 25 mg by mouth daily., Disp: , Rfl:      LACTOBACILLUS ACIDOPHILUS (ACIDOPHILUS ORAL), Take by mouth., Disp: , Rfl:      norethindrone (AYGESTIN) 5 mg tablet, Take one half tablet daily, Disp: 45 tablet, Rfl: 3     omega-3 fatty acids (FISH OIL) 500 mg cap, Take by mouth., Disp: , Rfl:      omeprazole (PRILOSEC) 20 MG capsule, Take 40 mg by mouth., Disp: , Rfl:      PATADAY 0.2 % Drop, , Disp: , Rfl:      " RESTASIS 0.05 % ophthalmic emulsion, , Disp: , Rfl:      tablet cutter Misc, Exchange CPAP mask for wisp nasal mask.   Length of Need: 99, Disp: , Rfl:      valACYclovir (VALTREX) 500 MG tablet, BEGIN AT ONSET OF SYMPTOMS: TAKE ONE TABLET TWICE DAILY FOR 3-5 DAYS. MAY REPEAT AS NEEDED., Disp: 20 tablet, Rfl: 1     vitamin E 400 UNIT capsule, Take by mouth., Disp: , Rfl:     /77 (Patient Site: Right Arm, Patient Position: Sitting, Cuff Size: Adult Regular)  Pulse 75  Ht 5' 4\" (1.626 m)  Wt 152 lb 11.2 oz (69.3 kg)  SpO2 99%  BMI 26.21 kg/m2  Body mass index is 26.21 kg/(m^2).    EXAM:  GENERAL:Well developed she appears her stated age  HEAD & NECK: Extraocular motions intact, anicteric sclera,  ABDOMEN: Soft and nondistended, positive bowel sounds  LUNGS:  CTA  HEART:  RRR  EXTREMITIES: Full mobility,   INTEGUMENT: The patient has a subcutaneous lesion located over the spine of the R scapula.   The lesion is 5 cm wide.    Assessment/Plan: The pt has a  5 cm  subcutaneous lesion located on the R scapula.    This lesion is likely either a Lipoma . These lesion is growing in size.  With these features I recommend removal of this lesion.     She would like to have these lesions removed. I discussed this with she. I discussed the risk of bleeding and infection with the patient. We will get this scheduled through our clinic.    Vijay Amaya MD  [phone]  [facility] Department of Surgery  "

## 2021-06-12 NOTE — TELEPHONE ENCOUNTER
Refill Approved    Rx renewed per Medication Renewal Policy. Medication was last renewed on 11/27/9.    Kinga Garvin, [facility] Triage/Med Refill 10/14/2020     Requested Prescriptions   Pending Prescriptions Disp Refills     estradioL (ESTRACE) 0.5 MG tablet [Pharmacy Med Name: ESTRADIOL 0.5MG TABLETS 0.5 MG] 90 tablet 3     Sig: TAKE TWO TABLETS DAILY UNTIL SYMPTOMS ARE UNDER CONTROL, THEN MAY BEGIN A TAPER.       Hormone Replacement Therapy Refill Protocol Passed - 10/12/2020 11:58 AM        Passed - PCP or prescribing provider visit in past 12 months       Last office visit with prescriber/PCP: Visit date not found OR same dept: Visit date not found OR same specialty: 6/10/2019 Sari Roe MD  Last physical: 11/27/2019 Last MTM visit: Visit date not found     Next visit within 3 mo: Visit date not found  Next physical within 3 mo: Visit date not found  Prescriber OR PCP: Carolyn Waddell MD  Last diagnosis associated with med order: 1. Symptomatic menopausal or female climacteric states  - estradioL (ESTRACE) 0.5 MG tablet [Pharmacy Med Name: ESTRADIOL 0.5MG TABLETS 0.5 MG]; TAKE TWO TABLETS DAILY UNTIL SYMPTOMS ARE UNDER CONTROL, THEN MAY BEGIN A TAPER.  Dispense: 90 tablet; Refill: 3     If protocol passes may refill for 3 months.

## 2021-06-13 NOTE — TELEPHONE ENCOUNTER
Refill Approved    Rx renewed per Medication Renewal Policy. Medication was last renewed on 8/1820.    Kinga Garvin, [facility] Triage/Med Refill 11/30/2020     Requested Prescriptions   Pending Prescriptions Disp Refills     norethindrone (AYGESTIN) 5 mg tablet [Pharmacy Med Name: NORETHINDRONE 5 MG TAB TABLET] 45 tablet 0     Sig: TAKE 1/2 TABLE BY MOUTH ONCE DAILY       Oral Contraceptives Protocol Passed - 11/30/2020  9:25 AM        Passed - Visit with PCP or prescribing provider visit in last 12 months      Last office visit with prescriber/PCP: Visit date not found OR same dept: Visit date not found OR same specialty: 6/10/2019 Sari Roe MD  Last physical: 11/27/2019 Last MTM visit: Visit date not found   Next visit within 3 mo: Visit date not found  Next physical within 3 mo: Visit date not found  Prescriber OR PCP: Carolyn Waddell MD  Last diagnosis associated with med order: 1. Premature menopause  - norethindrone (AYGESTIN) 5 mg tablet [Pharmacy Med Name: NORETHINDRONE 5 MG TAB TABLET]; TAKE 1/2 TABLE BY MOUTH ONCE DAILY  Dispense: 45 tablet; Refill: 0    If protocol passes may refill for 12 months if within 3 months of last provider visit (or a total of 15 months).            Hormone Replacement Therapy Refill Protocol Passed - 11/30/2020  9:25 AM        Passed - PCP or prescribing provider visit in past 12 months       Last office visit with prescriber/PCP: Visit date not found OR same dept: Visit date not found OR same specialty: 6/10/2019 Sari Roe MD  Last physical: 11/27/2019 Last MTM visit: Visit date not found     Next visit within 3 mo: Visit date not found  Next physical within 3 mo: Visit date not found  Prescriber OR PCP: Carolyn Waddell MD  Last diagnosis associated with med order: 1. Premature menopause  - norethindrone (AYGESTIN) 5 mg tablet [Pharmacy Med Name: NORETHINDRONE 5 MG TAB TABLET]; TAKE 1/2 TABLE BY MOUTH ONCE DAILY  Dispense: 45 tablet; Refill:  0     If protocol passes may refill for 3 months.

## 2021-06-14 NOTE — PROGRESS NOTES
Aster Story is a 52 y.o. female is here for a  Health Maintenance exam. Patient is overall doing well.       Healthy Habits:   Regular Exercise: Yes  Sunscreen Use: Yes  Healthy Diet: Yes  Dental Visits Regularly: Yes  Seat Belt: Yes  Sexually active: Yes  Self Breast Exam Monthly:Yes 2017  Hemoccults: No  Flex Sig: No  Colonoscopy: No  Lipid Profile: Yes  Glucose Screen: Yes  Prevention of Osteoporosis: Yes  Last Dexa: No  Guns at Home:  Yes  Guns Safety Locks:  Yes  Domestic Violence:  No    Current Outpatient Medications Include:    Current Outpatient Prescriptions:      calcium carbonate-vitamin D3 (OS-DANIEL 250+ D) 250-125 mg-unit Tab per tablet, Take by mouth., Disp: , Rfl:      cholecalciferol, vitamin D3, 1,000 unit capsule, Take 3,000 Units by mouth., Disp: , Rfl:      estradiol (ESTRACE) 0.5 MG tablet, ALTERNATE TAKING 1 TAB BY MOUTH DAILY AND 2 TABS BY MOUTH DAILY., Disp: 135 tablet, Rfl: 1     hydroCHLOROthiazide (HYDRODIURIL) 25 MG tablet, Take one tablet by mouth one time daily, Disp: 90 tablet, Rfl: 3     LACTOBACILLUS ACIDOPHILUS (ACIDOPHILUS ORAL), Take by mouth., Disp: , Rfl:      norethindrone (AYGESTIN) 5 mg tablet, Take one half tablet daily, Disp: 45 tablet, Rfl: 3     omega-3 fatty acids (FISH OIL) 500 mg cap, Take by mouth., Disp: , Rfl:      omeprazole (PRILOSEC) 20 MG capsule, Take 40 mg by mouth., Disp: , Rfl:      PATADAY 0.2 % Drop, , Disp: , Rfl:      RESTASIS 0.05 % ophthalmic emulsion, , Disp: , Rfl:      tablet cutter Misc, Exchange CPAP mask for wisp nasal mask.   Length of Need: 99, Disp: , Rfl:      valACYclovir (VALTREX) 500 MG tablet, Begin at onset of symptoms: take one tablet twice daily for 3-5 days. May repeat as needed., Disp: 20 tablet, Rfl: 1     vitamin E 400 UNIT capsule, Take by mouth., Disp: , Rfl:      albuterol (VENTOLIN HFA) 90 mcg/actuation inhaler, Inhale 2 puffs every 4 (four) hours as needed for wheezing., Disp: 1 Inhaler, Rfl: 3     benzonatate (TESSALON) 100  MG capsule, TAKE 1 CAPSULE (100 MG TOTAL) BY MOUTH 3 (THREE) TIMES A DAY AS NEEDED  FOR COUGH, Disp: 30 capsule, Rfl: 1     venlafaxine (EFFEXOR XR) 37.5 MG 24 hr capsule, Take 1 capsule (37.5 mg total) by mouth daily., Disp: 30 capsule, Rfl: 2    Allergies:    Allergies   Allergen Reactions     Lipitor [Atorvastatin] Myalgia       Past Medical History:   Diagnosis Date     Asthma      Dry eye      Hypertension      Pregnancy      Skin cancer        Past Surgical History:   Procedure Laterality Date     CARDIOVASCULAR STRESS TEST Bilateral      metatarsal osteotomy of the first metatarsal Bilateral      TX CV STRS TST XERS&/OR RX CONT ECG W/SI&R      Description: Cardiovascular Stress Test;  Recorded: 2014;  Comments: stress echo normal 2010     spinal diskectomy lumbar N/A        OB History    Para Term  AB Living   1 1 1   1   SAB TAB Ectopic Multiple Live Births             # Outcome Date GA Lbr Elmer/2nd Weight Sex Delivery Anes PTL Lv   1 Term     F Vag-Spont         Complications: Inverted uterus, postpartum condition          Immunization History   Administered Date(s) Administered     Hep A, historic 2008, 2009     Hep B, historic 1998, 1998, 01/15/1999     Influenza, inj, historic,unspecified 2011, 10/22/2012, 2013     Influenza, seasonal,quad inj 36+ mos 10/05/2015, 10/20/2016, 11/15/2017     Influenza,seasonal quad, PF 2014     Influenza,seasonal, Inj IIV3 10/13/1997, 1998, 1999, 2000, 2001, 2007, 2007, 10/06/2009, 2011     Td, Adult, Absorbed 1994, 2000     Tdap 2010       Family History   Problem Relation Age of Onset     Obstructive Sleep Apnea Father      PULMONARY DISEASE      Heart disease Father      Hypertension Father      Stroke Father      Rheum arthritis Father 40     Osteoporosis Mother      Rheum arthritis Sister        Social History     Social History     Marital  status:      Spouse name: N/A     Number of children: 1     Years of education: N/A     Occupational History      [employer]     Social History Main Topics     Smoking status: Former Smoker     Quit date: 1/28/2008     Smokeless tobacco: Never Used     Alcohol use Yes     Drug use: No     Sexual activity: Yes     Partners: Male     Other Topics Concern     Not on file     Social History Narrative       Last cholesterol:   Lab Results   Component Value Date    CHOL 239 (H) 11/15/2017    CHOL 161 03/15/2017    CHOL 132 10/20/2016     Lab Results   Component Value Date    HDL 43 (L) 11/15/2017    HDL 33 (L) 03/15/2017    HDL 40 (L) 10/20/2016     Lab Results   Component Value Date    LDLCALC 160 (H) 11/15/2017    LDLCALC 100 03/15/2017    LDLCALC 69 10/20/2016     Lab Results   Component Value Date    TRIG 179 (H) 11/15/2017    TRIG 142 03/15/2017    TRIG 115 10/20/2016     No components found for: CHOLHDL    Mammogram 2/22/2017  Has not yet had colonoscopy.    Birth Control Method:n/a  High Risk/Behavior: none      LMP: No LMP recorded. Patient is postmenopausal.      Review of Systems:   General:  Denies fever, chills, HA, fatigue, myalgias, weight change    Eyes: Denies vision changes   Ears/Nose/Throat: Denies nasal congestion, rhinorrhea, ear pain or discharge, sore throat, swollen glands  Cardiovascular: Denies CP, palpitations  Respiratory:  Denies SOB, cough  Gastrointestinal:  Denies changes in bowel habits, melena, rectal bleeding,  Genitourinary: Denies changes in urine habits/frequency/dysuria, hematuria   Musculoskeletal:  Denies  joint pain or swelling or erythema, edema  Skin: Denies rashes   Neurologic: Denies weakness, paresthesia  Psychiatric: Denies mood changes   Endocrine: Denies polyuria, polydipsia, polyphagia  Heme/Lymphatic: Denies problem with bleeding   Allergic/Immunologic: Denies problem     POSITIVES: 114 point review of systems is negative with the exception of: Excessive sweating,  "headaches, decreased hearing, memory loss.  She has worked hard to decrease the dose of estrogen replacement and currently is taking 0.5 per day.  She has really bad hot flashes if she skips.    PHYSICAL EXAM:    /76  Pulse 67  Temp 98.3  F (36.8  C)  Ht 5' 4\" (1.626 m)  Wt 147 lb (66.7 kg)  SpO2 99%  Breastfeeding? No  BMI 25.23 kg/m2    Wt Readings from Last 3 Encounters:   11/15/17 147 lb (66.7 kg)   04/26/17 152 lb 11.2 oz (69.3 kg)   03/15/17 147 lb (66.7 kg)       Body mass index is 25.23 kg/(m^2).    Well developed, well nourished, no acute distress.  HEENT: normocephalic/atraumatic, PERRLA/EOMI, TMs: Gray, normal light reflex, no nasal discharge.  Oral mucosa: no erythema/exudate  Neck: No LAD/masses/thyromegaly/bruits  Lungs: clear bilaterally  Heart: regular rate and rhythm, no murmurs/gallops/rubs  Breasts: symmetric, no masses/skin changes, nipple discharge, or axillary LAD.  BSE reviewed.  Abdomen: Normal bowel sounds, soft, non-tender, non-distended, no masses, neg Phipps's/McBurney's, no rebound/guarding  Genital: Normal external genitalia, no discharge, no lesions, cervix is non-friable, os is closed, no CMT, no adnexal tenderness or fullness.  Uterus is not enlarged, perineum intact.  Thin prep done.    Rectal: internal exam is deferred.  External exam is normal.  Lymphatics: no supraclavicular/axillary/epitrochlear/inguinal LAD. No edema.  Neuro: A&O x 3, CN II-XII intact, strength 5/5, reflexes symmetric, sensory intact to light touch.  Psych: Behavior appropriate, engaging.  Thought processes congruent, non-tangential.  Musculoskeletal: Extremities normal, atraumatic, no swelling  Skin: no rashes or lesions.    PHQ 9 score is 3 and Med 7 score is 1.    Recent Results (from the past 240 hour(s))   Thyroid Stimulating Hormone (TSH)   Result Value Ref Range    TSH 3.96 0.30 - 5.00 uIU/mL   HM2(CBC w/o Differential)   Result Value Ref Range    WBC 5.8 4.0 - 11.0 thou/uL    RBC 4.34 3.80 " - 5.40 mill/uL    Hemoglobin 14.1 12.0 - 16.0 g/dL    Hematocrit 41.6 35.0 - 47.0 %    MCV 96 80 - 100 fL    MCH 32.4 27.0 - 34.0 pg    MCHC 33.8 32.0 - 36.0 g/dL    RDW 10.8 (L) 11.0 - 14.5 %    Platelets 285 140 - 440 thou/uL    MPV 6.7 (L) 7.0 - 10.0 fL   Basic Metabolic Panel   Result Value Ref Range    Sodium 141 136 - 145 mmol/L    Potassium 3.2 (L) 3.5 - 5.0 mmol/L    Chloride 105 98 - 107 mmol/L    CO2 23 22 - 31 mmol/L    Anion Gap, Calculation 13 5 - 18 mmol/L    Glucose 83 70 - 125 mg/dL    Calcium 9.6 8.5 - 10.5 mg/dL    BUN 12 8 - 22 mg/dL    Creatinine 0.72 0.60 - 1.10 mg/dL    GFR MDRD Af Amer >60 >60 mL/min/1.73m2    GFR MDRD Non Af Amer >60 >60 mL/min/1.73m2   Lipid Cascade   Result Value Ref Range    Cholesterol 239 (H) <=199 mg/dL    Triglycerides 179 (H) <=149 mg/dL    HDL Cholesterol 43 (L) >=50 mg/dL    LDL Calculated 160 (H) <=129 mg/dL    Patient Fasting > 8hrs? Yes    Vitamin D, Total (25-Hydroxy)   Result Value Ref Range    Vitamin D, Total (25-Hydroxy) 43.4 30.0 - 80.0 ng/mL   Vitamin B12   Result Value Ref Range    Vitamin B-12 1248 (H) 213 - 816 pg/mL       ASSESSMENT/PLAN: 52 y.o. female physical exam and pap smear.          1. Routine general medical examination at a health care facility    - Thyroid Stimulating Hormone (TSH)  - HM2(CBC w/o Differential)  - Basic Metabolic Panel  - Lipid Cascade  - Vitamin D, Total (25-Hydroxy)  - Vitamin B12    2. Pap smear for cervical cancer screening    - Gynecologic Cytology (PAP Smear)  - HPV Cascade (PCR)    3. Screen for colon cancer    - Ambulatory referral for Colonoscopy    4. Flu vaccine need    - Influenza, Seasonal,Quad Inj, 36+ MOS            Medications Discontinued During This Encounter   Medication Reason     vitamin E 400 UNIT capsule Therapy completed     venlafaxine (EFFEXOR XR) 37.5 MG 24 hr capsule Therapy completed     benzonatate (TESSALON) 100 MG capsule Therapy completed       Routine health maintenance discussion:  No  smoking, limited alcohol (7 or less servings per week), 5 fruits/veg servings per day, 200 minutes of exercise per week.  Daily calcium/vitamin D guidelines, bone health, yearly mammogram after age 39/regular pap smears/colon cancer screening beginning at age 50.  Accident avoidance, sun screen.    Sometimes antidepressant medications are helpful in treating postmenopausal symptoms.  This would mean resuming the venlafaxine.  Patient prefers not to do this.  We will continue working on getting off the Estrace.  Please note patient only takes omeprazole as needed.  We talked about the side effect of osteoporosis from long-term use of this medication.  Will contact her with the results of the labs when available.    Carolyn Waddell M.D.

## 2021-06-16 PROBLEM — Z87.891 HISTORY OF SMOKING: Status: ACTIVE | Noted: 2018-11-24

## 2021-06-16 NOTE — PROGRESS NOTES
Consult to address a R shoulder mass.  Requested by Dr Waddell    HPI: Pt is here with concerns about a subcutaneous mass located over the R scapula.  It has been present for 2 years.   This lesion is not tender.  The lesion has not drained.  I had seen this pt months ago but she had insurance issues and needed to reschedule.    Allergies:Lipitor [atorvastatin]    Past Medical History:   Diagnosis Date     Asthma      Dry eye      Hypertension      Pregnancy      Skin cancer        Past Surgical History:   Procedure Laterality Date     CARDIOVASCULAR STRESS TEST Bilateral      metatarsal osteotomy of the first metatarsal Bilateral      MO CV STRS TST XERS&/OR RX CONT ECG W/SI&R      Description: Cardiovascular Stress Test;  Recorded: 09/13/2014;  Comments: stress echo normal 4/8/2010     spinal diskectomy lumbar N/A        REVIEW OF SYSTEMS:  10 point ROS is negative except for; as mentioned above.    CURRENT MEDS:    Current Outpatient Prescriptions:      calcium carbonate-vitamin D3 (OS-DANIEL 250+ D) 250-125 mg-unit Tab per tablet, Take by mouth., Disp: , Rfl:      cholecalciferol, vitamin D3, 1,000 unit capsule, Take 3,000 Units by mouth., Disp: , Rfl:      estradiol (ESTRACE) 0.5 MG tablet, ALTERNATE TAKING 1 TAB BY MOUTH DAILY AND 2 TABS BY MOUTH DAILY., Disp: 135 tablet, Rfl: 3     hydroCHLOROthiazide (HYDRODIURIL) 25 MG tablet, Take one tablet by mouth one time daily, Disp: 90 tablet, Rfl: 3     LACTOBACILLUS ACIDOPHILUS (ACIDOPHILUS ORAL), Take by mouth., Disp: , Rfl:      norethindrone (AYGESTIN) 5 mg tablet, Take one half tablet daily, Disp: 45 tablet, Rfl: 3     omega-3 fatty acids (FISH OIL) 500 mg cap, Take by mouth., Disp: , Rfl:      omeprazole (PRILOSEC) 20 MG capsule, Take 40 mg by mouth., Disp: , Rfl:      PATADAY 0.2 % Drop, , Disp: , Rfl:      potassium chloride (MICRO-K) 10 mEq CR capsule, Take 1 capsule (10 mEq total) by mouth daily., Disp: 90 capsule, Rfl: 3     RESTASIS 0.05 % ophthalmic  "emulsion, , Disp: , Rfl:      tablet cutter Misc, Exchange CPAP mask for wisp nasal mask.   Length of Need: 99, Disp: , Rfl:      valACYclovir (VALTREX) 500 MG tablet, TAKE ONE TABLET TWICE A DAY  FOR 3 TO 5 DAYS BEGIN AT ONSET MAY REPEAT AS DIRECTED, Disp: 20 tablet, Rfl: 3     albuterol (VENTOLIN HFA) 90 mcg/actuation inhaler, Inhale 2 puffs every 4 (four) hours as needed for wheezing., Disp: 1 Inhaler, Rfl: 3    /64  Pulse 78  Resp 18  Ht 5' 4\" (1.626 m)  Wt 150 lb (68 kg)  SpO2 100%  BMI 25.75 kg/m2  Body mass index is 25.75 kg/(m^2).    EXAM:  GENERAL:Well developed she appears her stated age  HEAD & NECK: Extraocular motions intact, anicteric sclera,  ABDOMEN: Soft and nondistended, positive bowel sounds  LUNGS:  CTA  HEART:  RRR  EXTREMITIES: Full mobility,   INTEGUMENT: The patient has a subcutaneous lesion located over the spine of the R scapula.   The lesion is 5 cm wide.    Assessment/Plan: The pt has a  5 cm  subcutaneous lesion located on the R scapula.    This lesion is likely either a Lipoma . These lesion is growing in size.  With these features I recommend removal of this lesion.     She would like to have these lesions removed. I discussed this with she. I discussed the risk of bleeding and infection with the patient. We will get this scheduled through our clinic.    Vijay Amaya MD  [phone]  [facility] Department of Surgery  "

## 2021-06-17 NOTE — PROGRESS NOTES
Preoperative Exam    Scheduled Procedure: Lipoma removal, right shoulder   Surgery Date:  5/3/18  Surgery Location: [facility], fax [phone]    Surgeon:  Dr Amaya     Assessment/Plan:     1. Pre-op exam  Normal exam with exception of palpable soft tissue mass over right shoulder  - Electrocardiogram Perform - Clinic    2. Mass on back  Excision planned    3. Hypertension  Well controlled  - Basic Metabolic Panel  - HM2(CBC w/o Differential)    4. Obstructive sleep apnea of adult  Uses CPAP    5. Arthropathy Of Both Hands      Carolyn Waddell MD 11:25 AM 4/24/2018        Surgical Procedure Risk: Low (reported cardiac risk generally < 1%)  Have you had prior anesthesia?: Yes  Have you or any family members had a previous anesthesia reaction:  No  Do you or any family members have a history of a clotting or bleeding disorder?: No  Cardiac Risk Assessment: no increased risk for major cardiac complications    Patient approved for surgery with general or local anesthesia.      Functional Status: Independent  Patient plans to recover at home with family.     Subjective:      Aster Story is a 53 y.o. female who presents for a preoperative consultation.  She has noted a slowly growing mass on her right upper back for a long time.  (It has grown now such that it shows through her clothing.)        Pertinent History  Do you have difficulty breathing or chest pain after walking up a flight of stairs: No  History of obstructive sleep apnea: Yes: cpap   Steroid use in the last 6 months: No  Frequent Aspirin/NSAID use: No  Prior Blood Transfusion: Yes: Following childbirth  Prior Blood Transfusion Reaction: No  If for some reason prior to, during or after the procedure, if it is medically indicated, would you be willing to have a blood transfusion?:  There is no transfusion refusal.    Current Outpatient Prescriptions   Medication Sig Dispense Refill     calcium carbonate-vitamin D3 (OS-DANIEL 250+ D) 250-125  mg-unit Tab per tablet Take by mouth.       cholecalciferol, vitamin D3, 1,000 unit capsule Take 3,000 Units by mouth.       hydroCHLOROthiazide (HYDRODIURIL) 25 MG tablet Take one tablet by mouth one time daily 90 tablet 3     LACTOBACILLUS ACIDOPHILUS (ACIDOPHILUS ORAL) Take by mouth.       omega-3 fatty acids (FISH OIL) 500 mg cap Take by mouth.       omeprazole (PRILOSEC) 20 MG capsule Take 40 mg by mouth.       PATADAY 0.2 % Drop        potassium chloride (MICRO-K) 10 mEq CR capsule Take 1 capsule (10 mEq total) by mouth daily. 90 capsule 3     venlafaxine (EFFEXOR XR) 37.5 MG 24 hr capsule Take 1 capsule (37.5 mg total) by mouth daily. 90 capsule 3     albuterol (VENTOLIN HFA) 90 mcg/actuation inhaler Inhale 2 puffs every 4 (four) hours as needed for wheezing. 1 Inhaler 3     estradiol (ESTRACE) 0.5 MG tablet ALTERNATE TAKING 1 TAB BY MOUTH DAILY AND 2 TABS BY MOUTH DAILY. 135 tablet 3     norethindrone (AYGESTIN) 5 mg tablet Take one half tablet daily 45 tablet 3     RESTASIS 0.05 % ophthalmic emulsion        tablet cutter Misc Exchange CPAP mask for wisp nasal mask.   Length of Need: 99       valACYclovir (VALTREX) 500 MG tablet TAKE ONE TABLET TWICE A DAY  FOR 3 TO 5 DAYS BEGIN AT ONSET MAY REPEAT AS DIRECTED 20 tablet 3     No current facility-administered medications for this visit.         Allergies   Allergen Reactions     Lipitor [Atorvastatin] Myalgia       Patient Active Problem List   Diagnosis     Major Depression, Single Episode     Hypertension     Cough Variant Asthma     Esophageal Reflux     Osteoarthritis     Arthropathy Of Both Hands     Premature Menopause     Osteopenia     Obstructive sleep apnea of adult       Past Medical History:   Diagnosis Date     Asthma      Dry eye      Hypertension      Pregnancy      Skin cancer        Past Surgical History:   Procedure Laterality Date     CARDIOVASCULAR STRESS TEST Bilateral      metatarsal osteotomy of the first metatarsal Bilateral      RI CV  "STRS TST XERS&/OR RX CONT ECG W/SI&R      Description: Cardiovascular Stress Test;  Recorded: 09/13/2014;  Comments: stress echo normal 4/8/2010     spinal diskectomy lumbar N/A        Social History     Social History     Marital status:      Spouse name: N/A     Number of children: 1     Years of education: N/A     Occupational History      [employer]     Social History Main Topics     Smoking status: Former Smoker     Quit date: 1/28/2008     Smokeless tobacco: Never Used     Alcohol use Yes     Drug use: No     Sexual activity: Yes     Partners: Male     Other Topics Concern     Not on file     Social History Narrative       Care Team            Carolyn Waddell MD PCP - General, Family Medicine    [phone]     Vijay Amaya MD Physician, General Surgery    [phone]                 Objective:     Vitals:    04/23/18 1644   BP: 118/80   Pulse: 67   Temp: 98.4  F (36.9  C)   SpO2: 98%   Weight: 145 lb (65.8 kg)   Height: 5' 4\" (1.626 m)             Objective:         PHQ-9 score is 8 and ALEXUS-7 score is 1.        Review of Systems     Denies fever, chills, visual changes, fatigue, myalgias, nasal congestion, rhinorrhea, ear pain, or discharge, sore throat, swollen glands, breast mass, nipple discharge, breast changes, abdominal pain, cough, shortness of breath, chest pain, weight change, change in bowel habits, melena, rectal bleeding, dysuria, frequency, urgency, hematuria, polyuria, polydipsia, polyphagia, joint pain or swelling or erythema, edema, rash, weakness, paresthesias, vaginal discharge or bleeding or mood changes.     POSITIVES:  bilateral hand numbness, sore hands, hot flashes    Objective:         /80  Pulse 67  Temp 98.4  F (36.9  C)  Ht 5' 4\" (1.626 m)  Wt 145 lb (65.8 kg)  SpO2 98%  Breastfeeding? No  BMI 24.89 kg/m2     Physical Exam:  General Appearance: Alert, cooperative, no distress, appears stated age  Head: Normocephalic, without obvious abnormality, " atraumatic  Eyes: PERRL, conjunctiva/corneas clear, EOM's intact  Ears: Normal TM's and external ear canals, both ears  Nose: Nares normal, septum midline,mucosa normal, no drainage  Throat: Lips, mucosa, and tongue normal; teeth and gums normal  Neck: Supple, symmetrical, trachea midline, no adenopathy;  thyroid: not enlarged, symmetric, no tenderness/mass/nodules; no carotid bruit or JVD  Back: Symmetric, no curvature, ROM normal, no CVA tenderness. Large soft mass over right shoulder consistent with lipoma.  (size of a door knob)  Lungs: Clear to auscultation bilaterally, respirations unlabored  Breasts: No breast masses, tenderness, asymmetry, or nipple discharge. Breast exam carefully reviewed with patient.  Heart: Regular rate and rhythm, S1 and S2 normal, no murmur, rub, or gallop  Abdomen: Soft, non-tender, bowel sounds active all four quadrants,  no masses, no organomegaly  Genital: deferred  Rectal: deferred  Extremities: Extremities normal, atraumatic, no cyanosis or edema  Skin: Skin color, texture, turgor normal, no rashes or lesions  Lymph nodes: Cervical, supraclavicular, and axillary nodes normal  Neurologic: Normal     Recent Results (from the past 240 hour(s))   Electrocardiogram Perform - Clinic   Result Value Ref Range    SYSTOLIC BLOOD PRESSURE  mmHg    DIASTOLIC BLOOD PRESSURE  mmHg    VENTRICULAR RATE 61 BPM    ATRIAL RATE 61 BPM    P-R INTERVAL 162 ms    QRS DURATION 62 ms    Q-T INTERVAL 402 ms    QTC CALCULATION (BEZET) 404 ms    P Axis 39 degrees    R AXIS 29 degrees    T AXIS 41 degrees    MUSE DIAGNOSIS       Normal sinus rhythm  Normal ECG  When compared with ECG of 19-DEC-2013 18:34,  No significant change was found  Confirmed by NELA JAMES MD LOC:[email or web address] ([id number]) on 4/23/2018 5:13:29 PM     Basic Metabolic Panel   Result Value Ref Range    Sodium 140 136 - 145 mmol/L    Potassium 3.7 3.5 - 5.0 mmol/L    Chloride 104 98 - 107 mmol/L    CO2 22 22 - 31 mmol/L    Anion Gap, Calculation 14 5  - 18 mmol/L    Glucose 87 70 - 125 mg/dL    Calcium 9.6 8.5 - 10.5 mg/dL    BUN 13 8 - 22 mg/dL    Creatinine 0.76 0.60 - 1.10 mg/dL    GFR MDRD Af Amer >60 >60 mL/min/1.73m2    GFR MDRD Non Af Amer >60 >60 mL/min/1.73m2   HM2(CBC w/o Differential)   Result Value Ref Range    WBC 4.8 4.0 - 11.0 thou/uL    RBC 4.27 3.80 - 5.40 mill/uL    Hemoglobin 13.7 12.0 - 16.0 g/dL    Hematocrit 40.6 35.0 - 47.0 %    MCV 95 80 - 100 fL    MCH 31.9 27.0 - 34.0 pg    MCHC 33.6 32.0 - 36.0 g/dL    RDW 11.3 11.0 - 14.5 %    Platelets 267 140 - 440 thou/uL    MPV 7.7 7.0 - 10.0 fL               Immunization History   Administered Date(s) Administered     Hep A, historic 08/27/2008, 03/05/2009     Hep B, historic 07/13/1998, 08/17/1998, 01/15/1999     Influenza, inj, historic,unspecified 11/08/2011, 10/22/2012, 12/04/2013     Influenza, seasonal,quad inj 36+ mos 10/05/2015, 10/20/2016, 11/15/2017     Influenza,seasonal quad, PF 09/12/2014     Influenza,seasonal, Inj IIV3 10/13/1997, 11/19/1998, 11/08/1999, 11/01/2000, 11/09/2001, 02/23/2007, 11/05/2007, 10/06/2009, 11/08/2011     Td, Adult, Absorbed 02/04/1994, 05/30/2000     Tdap 06/18/2010           Electronically signed by Carolyn Waddell MD 04/23/18 4:41 PM

## 2021-06-17 NOTE — ANESTHESIA POSTPROCEDURE EVALUATION
Patient: Aster Story  EXCISIONAL BIOPSY OF MASS OF FROM posterior right BACK  Anesthesia type: MAC    Patient location: Phase II Recovery  Last vitals:   Vitals:    05/03/18 1430   BP: 127/65   Pulse: 90   Resp: 16   Temp:    SpO2: 100%     Post vital signs: stable  Level of consciousness: awake and responds to simple questions  Post-anesthesia pain: pain controlled  Post-anesthesia nausea and vomiting: no  Pulmonary: unassisted, return to baseline  Cardiovascular: stable and blood pressure at baseline  Hydration: adequate  Anesthetic events: no    QCDR Measures:  ASA# 11 - Ghislaine-op Cardiac Arrest: ASA11B - Patient did NOT experience unanticipated cardiac arrest  ASA# 12 - Ghislaine-op Mortality Rate: ASA12B - Patient did NOT die  ASA# 13 - PACU Re-Intubation Rate: NA - No ETT / LMA used for case  ASA# 10 - Composite Anes Safety: ASA10A - No serious adverse event    Additional Notes:

## 2021-06-17 NOTE — ANESTHESIA CARE TRANSFER NOTE
Last vitals:   Vitals:    05/03/18 1425   BP: 127/63   Pulse: 97   Resp: 16   Temp: 37.2  C (99  F)   SpO2: 100%     Patient's level of consciousness is awake  Spontaneous respirations: yes  Maintains airway independently: yes  Dentition unchanged: yes  Oropharynx: oropharynx clear of all foreign objects    QCDR Measures:  ASA# 20 - Surgical Safety Checklist: WHO surgical safety checklist completed prior to induction  PQRS# 430 - Adult PONV Prevention: 4558F - Pt received => 2 anti-emetic agents (different classes) preop & intraop  ASA# 8 - Peds PONV Prevention: NA - Not pediatric patient, not GA or 2 or more risk factors NOT present  PQRS# 424 - Ghislaine-op Temp Management: 4559F - At least one body temp DOCUMENTED => 35.5C or 95.9F within required timeframe  PQRS# 426 - PACU Transfer Protocol: - Transfer of care checklist used  ASA# 14 - Acute Post-op Pain: ASA14B - Patient did NOT experience pain >= 7 out of 10

## 2021-06-17 NOTE — ANESTHESIA PREPROCEDURE EVALUATION
Anesthesia Evaluation      Patient summary reviewed   History of anesthetic complications (PONV)     Airway   Mallampati: II  Neck ROM: full   Pulmonary - normal exam    breath sounds clear to auscultation  (+) asthma  mild,  well controlled, sleep apnea on CPAP, ,                          Cardiovascular   (+) hypertension, ,     Rhythm: regular  Rate: normal,         Neuro/Psych - negative ROS     Endo/Other - negative ROS      GI/Hepatic/Renal    (+) GERD (denies symptoms currently) well controlled,             Dental    (+) caps                       Anesthesia Plan  Planned anesthetic: MAC  Versed, low dose propofol, low dose ketamine, avoid fentanyl.  Local to be given by Dr Amaya.  ASA 2     Anesthetic plan and risks discussed with: patient  Anesthesia plan special considerations: antiemetics,   Post-op plan: routine recovery

## 2021-06-19 NOTE — LETTER
Letter by Caorlyn Waddell MD at      Author: Carolyn Waddell MD Service: -- Author Type: --    Filed:  Encounter Date: 11/27/2019 Status: Signed         November 27, 2019     Patient: Aster Story   YOB: 1965   Date of Visit: 11/27/2019           To Whom It May Concern:    It is my medical opinion that Aster Story has obstructive sleep apnea.  This requires treatment with CPAP continuously throughout the night.  She must wear a mask strap to her face which places positive pressure to keep her airway open.  This is attached to a machine.  This precludes her ability to take calls during the night emergently.  She is unable to answer the phone in a timely fashion.  Lack of regular sleep affects her ability to work.  Her work hours are limited to 40 hours/week, daytime.    If you have any questions or concerns, please don't hesitate to call.    Sincerely,          Electronically signed by Carolyn Waddell MD

## 2021-06-20 NOTE — LETTER
Letter by Loki Steen CNP at      Author: Loki Steen CNP Service: -- Author Type: --    Filed:  Encounter Date: 6/4/2020 Status: (Other)       My Asthma Action Plan     Name: Aster Story   YOB: 1965  Date: 6/4/2020   My doctor: Loki Steen CNP   My clinic: [facility]        My Rescue Medicine:   Albuterol (Proair/Ventolin/Proventil HFA) 2-4 puffs EVERY 4 HOURS as needed. Use a spacer if recommended by your provider.   My Asthma Severity:   Intermittent/Exercise Induced  Know your asthma triggers: upper respiratory infections and cold air             GREEN ZONE   Good Control    I feel good    No cough or wheeze    Can work, sleep and play without asthma symptoms     Take your asthma control medicine every day.     1. If exercise triggers your asthma, take your rescue medication    15 minutes before exercise or sports, and    During exercise if you have asthma symptoms  2. Spacer to use with inhaler: If you have a spacer, make sure to use it with your inhaler             YELLOW ZONE Getting Worse  I have ANY of these:    I do not feel good    Cough or wheeze    Chest feels tight    Wake up at night 1. Keep taking your Green Zone medications  2. Start taking your rescue medicine:    every 20 minutes for up to 1 hour. Then every 4 hours for 24-48 hours.  3. If you stay in the Yellow Zone for more than 12-24 hours, contact your doctor.  4. If you do not return to the Green Zone in 12-24 hours or you get worse, start taking your oral steroid medicine if prescribed by your provider.           RED ZONE Medical Alert - Get Help  I have ANY of these:    I feel awful    Medicine is not helping    Breathing getting harder    Trouble walking or talking    Nose opens wide to breathe     1. Take your rescue medicine NOW  2. If your provider has prescribed an oral steroid medicine, start taking it NOW  3. Call your doctor NOW  4. If you are still in the Red Zone after  20 minutes and you have not reached your doctor:    Take your rescue medicine again and    Call 911 or go to the emergency room right away    See your regular doctor within 2 weeks of an Emergency Room or Urgent Care visit for follow-up treatment.          Annual Reminders:  Meet with Asthma Educator,  Flu Shot in the Fall, consider Pneumonia Vaccination for patients with asthma (aged 19 and older).    Pharmacy:   [facility] - [address]  [address] [place] [address] Street  [address]  [facility] [address]  Phone: [phone] Fax: [phone]      Electronically signed by Loki Steen CNP   Date: 06/04/20                      Asthma Triggers  How To Control Things That Make Your Asthma Worse    Triggers are things that make your asthma worse.  Look at the list below to help you find your triggers and what you can do about them.  You can help prevent asthma flare-ups by staying away from your triggers.      Trigger                                                          What you can do   Cigarette Smoke  Tobacco smoke can make asthma worse. Do not allow smoking in your home, car or around you.  Be sure no one smokes at a [facility] day care or school.  If you smoke, ask your health care provider for ways to help you quit.  Ask family members to quit too.  Ask your health care provider for a referral to Quit Plan to help you quit smoking, or call 1-820-739-PLAN.     Colds, Flu, Bronchitis  These are common triggers of asthma. Wash your hands often.  Dont touch your eyes, nose or mouth.  Get a flu shot every year.     Dust Mites  These are tiny bugs that live in cloth or carpet. They are too small to see. Wash sheets and blankets in hot water every week.   Encase pillows and mattress in dust mite proof covers.  Avoid having carpet if you can. If you have carpet, vacuum weekly.   Use a dust mask and HEPA vacuum.   Pollen and Outdoor Mold  Some people are allergic to trees, grass, or weed pollen, or molds. Try  to keep your windows closed.  Limit time out doors when pollen count is high.   Ask you health care provider about taking medicine during allergy season.     Animal Dander  Some people are allergic to skin flakes, urine or saliva from pets with fur or feathers. Keep pets with fur or feathers out of your home.    If you cant keep the pet outdoors, then keep the pet out of your bedroom.  Keep the bedroom door closed.  Keep pets off cloth furniture and away from stuffed toys.     Mice, Rats, and Cockroaches  Some people are allergic to the waste from these pests.   Cover food and garbage.  Clean up spills and food crumbs.  Store grease in the refrigerator.   Keep food out of the bedroom.   Indoor Mold  This can be a trigger if your home has high moisture. Fix leaking faucets, pipes, or other sources of water.   Clean moldy surfaces.  Dehumidify basement if it is damp and smelly.   Smoke, Strong Odors, and Sprays  These can reduce air quality. Stay away from strong odors and sprays, such as perfume, powder, hair spray, paints, smoke incense, paint, cleaning products, candles and new carpet.   Exercise or Sports  Some people with asthma have this trigger. Be active!  Ask your doctor about taking medicine before sports or exercise to prevent symptoms.    Warm up for 5-10 minutes before and after sports or exercise.     Other Triggers of Asthma  Cold air:  Cover your nose and mouth with a scarf.  Sometimes laughing or crying can be a trigger.  Some medicines and food can trigger asthma.

## 2021-06-21 NOTE — PROGRESS NOTES
PROGRESS NOTE       SUBJECTIVE:  Aster Story is a 53 y.o. female   Chief Complaint   Patient presents with     Annual Exam     physical      Patient is here today for a physical exam.  [facility] and [facility] me a copy which we will scan into the electronic record.  This includes normal TSH, electrolytes, sugar, and renal functions.  Her liver function is mildly elevated with an AST of 63; ALT is normal.  Cholesterol is 221 with an HDL of 45 and .  CBC is normal.    She also requires documentation for work of her obstructive sleep apnea.  Please see [facility] paperwork, letter, and other documentation which is completed and scanned into the electronic record.    Patient Active Problem List   Diagnosis     Major Depression, Single Episode     Hypertension     Cough Variant Asthma     Esophageal Reflux     Osteoarthritis     Arthropathy Of Both Hands     Premature Menopause     Osteopenia     Obstructive sleep apnea of adult     Lipoma of torso       Current Outpatient Medications   Medication Sig Dispense Refill     calcium carbonate-vitamin D3 (OS-DANIEL 250+ D) 250-125 mg-unit Tab per tablet Take by mouth.       cholecalciferol, vitamin D3, 1,000 unit capsule Take 3,000 Units by mouth.       estradiol (ESTRACE) 0.5 MG tablet Take two tablets daily until symptoms are under control, then may begin a taper. 180 tablet 0     hydroCHLOROthiazide (HYDRODIURIL) 25 MG tablet Take 1 tablet (25 mg total) by mouth daily. 90 tablet 0     LACTOBACILLUS ACIDOPHILUS (ACIDOPHILUS ORAL) Take by mouth.       norethindrone (AYGESTIN) 5 mg tablet 1/2 TAB ONCE DAILY 45 tablet 1     omega-3 fatty acids (FISH OIL) 500 mg cap Take by mouth.       omeprazole (PRILOSEC) 20 MG capsule Take 40 mg by mouth.       PATADAY 0.2 % Drop        potassium chloride (MICRO-K) 10 mEq CR capsule Take 1 capsule (10 mEq total) by mouth daily. 90 capsule 3     RESTASIS 0.05 % ophthalmic emulsion        valACYclovir  (VALTREX) 500 MG tablet TAKE ONE TABLET BY MOUTH TWICE A DAY FOR 3-5 DAYS-BEGIN AT ONSET-MAY REPEAT AS DIRECTED 20 tablet 6     albuterol (VENTOLIN HFA) 90 mcg/actuation inhaler Inhale 2 puffs every 4 (four) hours as needed for wheezing. 1 Inhaler 3     HYDROcodone-acetaminophen 5-325 mg per tablet Take 1 tablet by mouth every 4 (four) hours as needed for pain. 15 tablet 0     potassium chloride (KLOR-CON) 10 MEQ CR tablet TAKE ONE TABLET ONCE DAILY 90 tablet 1     tablet cutter Misc Exchange CPAP mask for wisp nasal mask.   Length of Need: 99       venlafaxine (EFFEXOR XR) 37.5 MG 24 hr capsule Take 1 capsule (37.5 mg total) by mouth daily. 90 capsule 3     No current facility-administered medications for this visit.        Social History     Tobacco Use   Smoking Status Former Smoker     Last attempt to quit: 1/28/2008     Years since quitting: 10.8   Smokeless Tobacco Never Used       REVIEW OF SYSTEMS:  Patient denies fever, chills, dizziness, headache, visual change, ear pain, cough, chest pain, shortness of breath, abdominal pain, extremity pain or swelling, rash,  depression or anxiety.    OBJECTIVE:       Vitals:    11/21/18 1354   BP: 122/70   Pulse: 88   Temp: 98.4  F (36.9  C)   SpO2: 100%     Weight: 149 lb (67.6 kg)    Wt Readings from Last 3 Encounters:   11/21/18 149 lb (67.6 kg)   05/01/18 150 lb (68 kg)   04/23/18 145 lb (65.8 kg)     Body mass index is 25.58 kg/m .        Physical Exam:  GENERAL APPEARANCE: A&A, NAD, well hydrated, well nourished  SKIN:  Normal skin turgor, no lesions/rashes   EARS: TM's normal, gray with nl light reflex  OROPHARYNX: without erythema, no post nasal drainage or thrush  NECK: Supple, without lymphadenopathy, no thyroid mass  CV: RRR, no M/G/R   LUNGS: CTAB, normal respiratory effort  ABDOMEN: S&NT, no masses, no organomegaly   PELVIC: Normal perineum.  No vaginal discharge.  Uterus is small and there are no adnexal masses.  Pap smear is not done.  EXTREMITY:  Extremities normal, atraumatic, no swelling  NEURO: no gross deficits   PSYCHIATRIC:  Mood appropriate, memory intact        ASSESSMENT/PLAN:     1. Routine general medical examination at a health care facility  Normal exam    2. Screen for colon cancer  Patient agrees to schedule colonoscopy for cancer surveillance and prevention  - Ambulatory referral for Colonoscopy    3. Obstructive sleep apnea of adult  Please see [facility] paperwork which is completed.  She is doing well with current CPAP.    4. Hypertension  Well-controlled.    5. History of smoking  15 pack year history, quit 2008        Medications Discontinued During This Encounter   Medication Reason     potassium chloride (KLOR-CON) 10 MEQ CR tablet Duplicate order     HYDROcodone-acetaminophen 5-325 mg per tablet Duplicate order     Return in about 1 year (around 11/21/2019) for Annual physical.    I spent a total of 60 minutes face to face with the patient.  Over 50% of the time spent counseling and educating the patient about all of the above.   (40 minutes conducting routine physical exam.  20 minutes documenting and completing [facility] paperwork and forms for work.)    Carolyn Waddell MD

## 2021-06-22 NOTE — TELEPHONE ENCOUNTER
Refill Approved    Rx renewed per Medication Renewal Policy. Medication was last renewed on 8/15/18.    Kinga Garvin, [facility] Triage/Med Refill 1/8/2019     Requested Prescriptions   Pending Prescriptions Disp Refills     hydroCHLOROthiazide (HYDRODIURIL) 25 MG tablet [Pharmacy Med Name: HYDROCHLOROT TAB 25MG TABLET] 90 tablet 0     Sig: TAKE 1 TABLET (25 MG TOTAL) BY MOUTH DAILY.    Diuretics/Combination Diuretics Refill Protocol  Passed - 1/7/2019  9:08 AM       Passed - Visit with PCP or prescribing provider visit in past 12 months    Last office visit with prescriber/PCP: 5/11/2016 Carolyn Waddell MD OR same dept: Visit date not found OR same specialty: 3/15/2017 Jordon Oro MD  Last physical: 11/21/2018 Last MTM visit: Visit date not found   Next visit within 3 mo: Visit date not found  Next physical within 3 mo: Visit date not found  Prescriber OR PCP: Carolyn Waddell MD  Last diagnosis associated with med order: 1. Hypertension  - hydroCHLOROthiazide (HYDRODIURIL) 25 MG tablet [Pharmacy Med Name: HYDROCHLOROT TAB 25MG TABLET]; Take 1 tablet (25 mg total) by mouth daily.  Dispense: 90 tablet; Refill: 0    If protocol passes may refill for 12 months if within 3 months of last provider visit (or a total of 15 months).            Passed - Serum Potassium in past 12 months     Lab Results   Component Value Date    Potassium 3.7 04/23/2018            Passed - Serum Sodium in past 12 months     Lab Results   Component Value Date    Sodium 140 04/23/2018            Passed - Blood pressure on file in past 12 months    BP Readings from Last 1 Encounters:   11/21/18 122/70            Passed - Serum Creatinine in past 12 months     Creatinine   Date Value Ref Range Status   04/23/2018 0.76 0.60 - 1.10 mg/dL Final

## 2021-06-24 NOTE — TELEPHONE ENCOUNTER
RN declined refill request for Hyrdochlorothiazide. Rx last filled on 1/8/19 for qty 90 refill 0. Pharmacy informed.     Leila Guan RN [facility] Triage/Med Refill 2/20/2019 6:17 PM

## 2021-07-03 NOTE — ADDENDUM NOTE
Addendum Note by Malika Waddell MD at 11/19/2017 10:22 AM     Author: Malika Waddell MD Service: -- Author Type: Physician    Filed: 11/19/2017 10:22 AM Encounter Date: 11/15/2017 Status: Signed    : Malika Waddell MD (Physician)    Addended by: MALIKA WADDELL on: 11/19/2017 10:22 AM        Modules accepted: Orders

## 2021-07-03 NOTE — ADDENDUM NOTE
Addendum Note by Roslyn Choudhary CMA at 4/3/2019  8:17 AM     Author: Roslyn Choudhary CMA Service: -- Author Type: Certified Medical Assistant    Filed: 4/3/2019  8:17 AM Encounter Date: 4/1/2019 Status: Signed    : Roslyn Choudhary CMA (Certified Medical Assistant)    Addended by: ROSLYN CHOUDHARY on: 4/3/2019 08:17 AM        Modules accepted: Orders

## 2021-07-03 NOTE — ADDENDUM NOTE
Addendum Note by Nemesio Steen CNP at 4/3/2019  1:44 PM     Author: Nemesio Steen CNP Service: -- Author Type: Nurse Practitioner    Filed: 4/3/2019  1:44 PM Encounter Date: 4/1/2019 Status: Signed    : Nemesio Steen CNP (Nurse Practitioner)    Addended by: NEMESIO STEEN on: 4/3/2019 01:44 PM        Modules accepted: Orders

## 2021-08-22 ENCOUNTER — HEALTH MAINTENANCE LETTER (OUTPATIENT)
Age: 56
End: 2021-08-22

## 2021-10-17 ENCOUNTER — HEALTH MAINTENANCE LETTER (OUTPATIENT)
Age: 56
End: 2021-10-17

## 2022-10-01 ENCOUNTER — HEALTH MAINTENANCE LETTER (OUTPATIENT)
Age: 57
End: 2022-10-01

## 2023-10-21 ENCOUNTER — HEALTH MAINTENANCE LETTER (OUTPATIENT)
Age: 58
End: 2023-10-21

## 2023-11-08 ENCOUNTER — LAB REQUISITION (OUTPATIENT)
Dept: LAB | Facility: CLINIC | Age: 58
End: 2023-11-08

## 2023-11-08 PROCEDURE — 86431 RHEUMATOID FACTOR QUANT: CPT | Performed by: STUDENT IN AN ORGANIZED HEALTH CARE EDUCATION/TRAINING PROGRAM

## 2023-11-08 PROCEDURE — 86225 DNA ANTIBODY NATIVE: CPT | Performed by: STUDENT IN AN ORGANIZED HEALTH CARE EDUCATION/TRAINING PROGRAM

## 2023-11-08 PROCEDURE — 86038 ANTINUCLEAR ANTIBODIES: CPT | Performed by: STUDENT IN AN ORGANIZED HEALTH CARE EDUCATION/TRAINING PROGRAM

## 2023-11-09 LAB — RHEUMATOID FACT SER NEPH-ACNC: <6 IU/ML

## 2023-11-10 LAB
ANA SER QL IF: NEGATIVE
DSDNA AB SER-ACNC: 0.8 IU/ML

## 2025-02-06 NOTE — TELEPHONE ENCOUNTER
Your Care Transitions Nurse is Diann Vann RN and can be directly reached at [phone].     This prescription is different than what Dr. Waddell had on record. The dosing that is requested for 2 days usually is for 1 day if it is for cold sores.    Joaquin Pretty MD